# Patient Record
Sex: FEMALE | Race: ASIAN | Employment: OTHER | URBAN - METROPOLITAN AREA
[De-identification: names, ages, dates, MRNs, and addresses within clinical notes are randomized per-mention and may not be internally consistent; named-entity substitution may affect disease eponyms.]

---

## 2023-01-29 ENCOUNTER — HOSPITAL ENCOUNTER (OUTPATIENT)
Age: 73
Discharge: HOME | End: 2023-01-29
Payer: MEDICARE

## 2023-01-29 VITALS
WEIGHT: 122 LBS | SYSTOLIC BLOOD PRESSURE: 147 MMHG | HEIGHT: 64 IN | RESPIRATION RATE: 20 BRPM | TEMPERATURE: 99 F | HEART RATE: 71 BPM | BODY MASS INDEX: 20.83 KG/M2 | DIASTOLIC BLOOD PRESSURE: 67 MMHG | OXYGEN SATURATION: 99 %

## 2023-01-29 DIAGNOSIS — L02.214 ABSCESS OF GROIN, RIGHT: Primary | ICD-10-CM

## 2023-01-29 PROCEDURE — 99204 OFFICE O/P NEW MOD 45 MIN: CPT

## 2023-01-29 PROCEDURE — 10061 I&D ABSCESS COMP/MULTIPLE: CPT

## 2023-01-29 PROCEDURE — 87070 CULTURE OTHR SPECIMN AEROBIC: CPT | Performed by: PHYSICIAN ASSISTANT

## 2023-01-29 PROCEDURE — 87205 SMEAR GRAM STAIN: CPT | Performed by: PHYSICIAN ASSISTANT

## 2023-01-29 PROCEDURE — 87077 CULTURE AEROBIC IDENTIFY: CPT | Performed by: PHYSICIAN ASSISTANT

## 2023-01-29 RX ORDER — AMLODIPINE BESYLATE 5 MG/1
5 TABLET ORAL 2 TIMES DAILY
COMMUNITY
Start: 2023-01-06

## 2023-01-29 RX ORDER — LISINOPRIL 20 MG/1
20 TABLET ORAL 2 TIMES DAILY
COMMUNITY
Start: 2023-01-06

## 2023-01-29 RX ORDER — SULFAMETHOXAZOLE AND TRIMETHOPRIM 800; 160 MG/1; MG/1
1 TABLET ORAL 2 TIMES DAILY
COMMUNITY
Start: 2023-01-28

## 2023-01-29 RX ORDER — ATENOLOL 25 MG/1
25 TABLET ORAL 2 TIMES DAILY
COMMUNITY
Start: 2022-12-29

## 2023-01-29 RX ORDER — ATORVASTATIN CALCIUM 40 MG/1
40 TABLET, FILM COATED ORAL EVERY EVENING
COMMUNITY
Start: 2023-01-05

## 2023-01-29 RX ORDER — PANTOPRAZOLE SODIUM 40 MG/1
40 TABLET, DELAYED RELEASE ORAL DAILY
COMMUNITY
Start: 2022-11-16

## 2023-01-29 RX ORDER — DAPAGLIFLOZIN 10 MG/1
10 TABLET, FILM COATED ORAL DAILY
COMMUNITY
Start: 2022-12-16

## 2023-01-29 NOTE — DISCHARGE INSTRUCTIONS
Keep the area clean and dry close follow-up in 48 to 72 hours for wound recheck complete Bactrim as prescribed

## 2023-01-29 NOTE — ED INITIAL ASSESSMENT (HPI)
Right  Groin -  Abscess noted Friday. Ruptured yesterday  bloody pus-like discharge noted. Pt has been cleaning and applying antibiotic ointment. Yesterday pt called her niece who is a doctor in [place] who prescribed her Bactrim  on day 2 of 7.     denies fever.

## 2023-01-31 ENCOUNTER — HOSPITAL ENCOUNTER (OUTPATIENT)
Age: 73
Discharge: HOME | End: 2023-01-31
Payer: MEDICARE

## 2023-01-31 VITALS
OXYGEN SATURATION: 98 % | TEMPERATURE: 98 F | HEART RATE: 66 BPM | RESPIRATION RATE: 18 BRPM | DIASTOLIC BLOOD PRESSURE: 65 MMHG | WEIGHT: 122 LBS | SYSTOLIC BLOOD PRESSURE: 173 MMHG | BODY MASS INDEX: 21 KG/M2

## 2023-01-31 DIAGNOSIS — Z51.89 WOUND CHECK, ABSCESS: Primary | ICD-10-CM

## 2023-01-31 PROCEDURE — 99211 OFF/OP EST MAY X REQ PHY/QHP: CPT

## 2023-01-31 NOTE — DISCHARGE INSTRUCTIONS
Continue to take Bactrim as previously prescribed. It is okay to shower and wash wound gently with mild soap and water. Cover with a bandage. Signs of infection (wound)    Observe wound closely for signs of infection:  Increased redness, red streaking, increased pain, increased warmth to area, swelling, pus-like drainage, fever, muscle aches, or generally feeling weak or ill.     Seek immediate medical attention if any of the above symptoms occur

## 2023-02-04 RX ORDER — CEPHALEXIN 500 MG/1
500 CAPSULE ORAL 3 TIMES DAILY
Qty: 21 CAPSULE | Refills: 0 | Status: SHIPPED | OUTPATIENT
Start: 2023-02-04

## 2024-04-06 ENCOUNTER — APPOINTMENT (OUTPATIENT)
Dept: GENERAL RADIOLOGY | Facility: HOSPITAL | Age: 74
End: 2024-04-06
Attending: EMERGENCY MEDICINE
Payer: MEDICARE

## 2024-04-06 ENCOUNTER — HOSPITAL ENCOUNTER (INPATIENT)
Facility: HOSPITAL | Age: 74
LOS: 6 days | Discharge: HOME HEALTH | End: 2024-04-12
Attending: EMERGENCY MEDICINE | Admitting: HOSPITALIST
Payer: MEDICARE

## 2024-04-06 ENCOUNTER — APPOINTMENT (OUTPATIENT)
Dept: CT IMAGING | Facility: HOSPITAL | Age: 74
End: 2024-04-06
Attending: EMERGENCY MEDICINE
Payer: MEDICARE

## 2024-04-06 ENCOUNTER — HOSPITAL ENCOUNTER (INPATIENT)
Facility: HOSPITAL | Age: 74
LOS: 6 days | Discharge: HOME | End: 2024-04-12
Attending: EMERGENCY MEDICINE | Admitting: HOSPITALIST
Payer: MEDICARE

## 2024-04-06 DIAGNOSIS — D72.829 LEUKOCYTOSIS, UNSPECIFIED TYPE: ICD-10-CM

## 2024-04-06 DIAGNOSIS — R55 SYNCOPE AND COLLAPSE: Primary | ICD-10-CM

## 2024-04-06 DIAGNOSIS — S20.211A CONTUSION OF RIB ON RIGHT SIDE, INITIAL ENCOUNTER: ICD-10-CM

## 2024-04-06 DIAGNOSIS — E83.42 HYPOMAGNESEMIA: ICD-10-CM

## 2024-04-06 LAB
ALBUMIN SERPL-MCNC: 2.4 G/DL (ref 3.4–5)
ALBUMIN/GLOB SERPL: 0.5 {RATIO} (ref 1–2)
ALP LIVER SERPL-CCNC: 178 U/L
ALT SERPL-CCNC: 109 U/L
ANION GAP SERPL CALC-SCNC: 9 MMOL/L (ref 0–18)
AST SERPL-CCNC: 74 U/L (ref 15–37)
ATRIAL RATE: 84 BPM
BASOPHILS # BLD AUTO: 0.03 X10(3) UL (ref 0–0.2)
BASOPHILS NFR BLD AUTO: 0.2 %
BILIRUB SERPL-MCNC: 0.7 MG/DL (ref 0.1–2)
BILIRUB UR QL STRIP.AUTO: NEGATIVE
BUN BLD-MCNC: 26 MG/DL (ref 9–23)
CALCIUM BLD-MCNC: 9.1 MG/DL (ref 8.5–10.1)
CHLORIDE SERPL-SCNC: 102 MMOL/L (ref 98–112)
CLARITY UR REFRACT.AUTO: CLEAR
CO2 SERPL-SCNC: 23 MMOL/L (ref 21–32)
CREAT BLD-MCNC: 1.16 MG/DL
EGFRCR SERPLBLD CKD-EPI 2021: 50 ML/MIN/1.73M2 (ref 60–?)
EOSINOPHIL # BLD AUTO: 0.01 X10(3) UL (ref 0–0.7)
EOSINOPHIL NFR BLD AUTO: 0.1 %
ERYTHROCYTE [DISTWIDTH] IN BLOOD BY AUTOMATED COUNT: 14.8 %
EST. AVERAGE GLUCOSE BLD GHB EST-MCNC: 209 MG/DL (ref 68–126)
GLOBULIN PLAS-MCNC: 4.8 G/DL (ref 2.8–4.4)
GLUCOSE BLD-MCNC: 170 MG/DL (ref 70–99)
GLUCOSE BLD-MCNC: 173 MG/DL (ref 70–99)
GLUCOSE BLD-MCNC: 174 MG/DL (ref 70–99)
GLUCOSE BLD-MCNC: 179 MG/DL (ref 70–99)
GLUCOSE UR STRIP.AUTO-MCNC: >1000 MG/DL
HBA1C MFR BLD: 8.9 % (ref ?–5.7)
HCT VFR BLD AUTO: 36.1 %
HGB BLD-MCNC: 11.8 G/DL
HYALINE CASTS #/AREA URNS AUTO: PRESENT /LPF
IMM GRANULOCYTES # BLD AUTO: 0.15 X10(3) UL (ref 0–1)
IMM GRANULOCYTES NFR BLD: 0.8 %
LEUKOCYTE ESTERASE UR QL STRIP.AUTO: NEGATIVE
LYMPHOCYTES # BLD AUTO: 0.94 X10(3) UL (ref 1–4)
LYMPHOCYTES NFR BLD AUTO: 4.8 %
MAGNESIUM SERPL-MCNC: 1.5 MG/DL (ref 1.6–2.6)
MCH RBC QN AUTO: 26.3 PG (ref 26–34)
MCHC RBC AUTO-ENTMCNC: 32.7 G/DL (ref 31–37)
MCV RBC AUTO: 80.6 FL
MONOCYTES # BLD AUTO: 1.27 X10(3) UL (ref 0.1–1)
MONOCYTES NFR BLD AUTO: 6.4 %
NEUTROPHILS # BLD AUTO: 17.34 X10 (3) UL (ref 1.5–7.7)
NEUTROPHILS # BLD AUTO: 17.34 X10(3) UL (ref 1.5–7.7)
NEUTROPHILS NFR BLD AUTO: 87.7 %
NITRITE UR QL STRIP.AUTO: NEGATIVE
OSMOLALITY SERPL CALC.SUM OF ELEC: 287 MOSM/KG (ref 275–295)
P AXIS: 67 DEGREES
P-R INTERVAL: 154 MS
PH UR STRIP.AUTO: 5.5 [PH] (ref 5–8)
PLATELET # BLD AUTO: 459 10(3)UL (ref 150–450)
POTASSIUM SERPL-SCNC: 3.4 MMOL/L (ref 3.5–5.1)
PROT SERPL-MCNC: 7.2 G/DL (ref 6.4–8.2)
PROT UR STRIP.AUTO-MCNC: 50 MG/DL
Q-T INTERVAL: 344 MS
QRS DURATION: 76 MS
QTC CALCULATION (BEZET): 406 MS
R AXIS: 33 DEGREES
RBC # BLD AUTO: 4.48 X10(6)UL
SODIUM SERPL-SCNC: 134 MMOL/L (ref 136–145)
SP GR UR STRIP.AUTO: 1.03 (ref 1–1.03)
T AXIS: 52 DEGREES
TROPONIN I SERPL HS-MCNC: 4 NG/L
TSI SER-ACNC: 0.82 MIU/ML (ref 0.36–3.74)
UROBILINOGEN UR STRIP.AUTO-MCNC: NORMAL MG/DL
VENTRICULAR RATE: 84 BPM
WBC # BLD AUTO: 19.7 X10(3) UL (ref 4–11)

## 2024-04-06 PROCEDURE — 71101 X-RAY EXAM UNILAT RIBS/CHEST: CPT | Performed by: EMERGENCY MEDICINE

## 2024-04-06 PROCEDURE — 99223 1ST HOSP IP/OBS HIGH 75: CPT | Performed by: HOSPITALIST

## 2024-04-06 PROCEDURE — 70450 CT HEAD/BRAIN W/O DYE: CPT | Performed by: EMERGENCY MEDICINE

## 2024-04-06 RX ORDER — PANTOPRAZOLE SODIUM 40 MG/1
40 TABLET, DELAYED RELEASE ORAL
COMMUNITY

## 2024-04-06 RX ORDER — ATORVASTATIN CALCIUM 40 MG/1
40 TABLET, FILM COATED ORAL EVERY EVENING
Status: DISCONTINUED | OUTPATIENT
Start: 2024-04-06 | End: 2024-04-12

## 2024-04-06 RX ORDER — ROSUVASTATIN CALCIUM 20 MG/1
20 TABLET, COATED ORAL NIGHTLY
COMMUNITY

## 2024-04-06 RX ORDER — ACETAMINOPHEN 325 MG/1
650 TABLET ORAL EVERY 6 HOURS PRN
Status: DISCONTINUED | OUTPATIENT
Start: 2024-04-06 | End: 2024-04-12

## 2024-04-06 RX ORDER — AMLODIPINE BESYLATE 5 MG/1
5 TABLET ORAL 2 TIMES DAILY
Status: DISCONTINUED | OUTPATIENT
Start: 2024-04-06 | End: 2024-04-07

## 2024-04-06 RX ORDER — AMLODIPINE AND VALSARTAN 10; 320 MG/1; MG/1
1 TABLET ORAL DAILY
COMMUNITY

## 2024-04-06 RX ORDER — DEXTROSE MONOHYDRATE 25 G/50ML
50 INJECTION, SOLUTION INTRAVENOUS
Status: DISCONTINUED | OUTPATIENT
Start: 2024-04-06 | End: 2024-04-12

## 2024-04-06 RX ORDER — PANTOPRAZOLE SODIUM 40 MG/1
40 TABLET, DELAYED RELEASE ORAL DAILY
Status: DISCONTINUED | OUTPATIENT
Start: 2024-04-06 | End: 2024-04-12

## 2024-04-06 RX ORDER — MAGNESIUM SULFATE HEPTAHYDRATE 40 MG/ML
2 INJECTION, SOLUTION INTRAVENOUS ONCE
Status: COMPLETED | OUTPATIENT
Start: 2024-04-06 | End: 2024-04-06

## 2024-04-06 RX ORDER — ATENOLOL 25 MG/1
25 TABLET ORAL 2 TIMES DAILY
Status: DISCONTINUED | OUTPATIENT
Start: 2024-04-06 | End: 2024-04-12

## 2024-04-06 RX ORDER — POTASSIUM CHLORIDE 20 MEQ/1
40 TABLET, EXTENDED RELEASE ORAL EVERY 4 HOURS
Status: COMPLETED | OUTPATIENT
Start: 2024-04-06 | End: 2024-04-06

## 2024-04-06 RX ORDER — NICOTINE POLACRILEX 4 MG
15 LOZENGE BUCCAL
Status: DISCONTINUED | OUTPATIENT
Start: 2024-04-06 | End: 2024-04-12

## 2024-04-06 RX ORDER — NICOTINE POLACRILEX 4 MG
30 LOZENGE BUCCAL
Status: DISCONTINUED | OUTPATIENT
Start: 2024-04-06 | End: 2024-04-12

## 2024-04-06 NOTE — ED QUICK NOTES
Orders for admission, patient is aware of plan and ready to go upstairs. Any questions, please call ED RN Ellen at extension [phone].     Patient Covid vaccination status: Fully vaccinated     COVID Test Ordered in ED: None    COVID Suspicion at Admission: N/A    Running Infusions:      Mental Status/LOC at time of transport: AOX4    Other pertinent information: Pt from home being admitted for multiple near syncope events.   CIWA score: N/A   NIH score:  0

## 2024-04-06 NOTE — ED PROVIDER NOTES
Patient Seen in: [facility] Emergency Department      History     Chief Complaint   Patient presents with    Fall    Dizziness     Stated Complaint: falls    Subjective:   73-year-old female, history of diabetes hypertension hyperlipidemia, presents via EMS with her  with complaints of near syncope, near fall and some dizziness.  Patient 3 days ago she stood up and felt lightheaded, states anything happened today.   states did not fall the ground but patient states that her son found her lying on the floor.  She denies any pain other than some right lateral rib pain which has been having for the last couple weeks after she fell into her dresser.  Has not seen any medical care for this as of yet.  She is wearing a rib belt for that.  States she has no pain otherwise.  No headache.  No blurred vision but she states when she gets lightheaded that she sees some white across her visual fields.  No neck pain.  No chest pain cough or shortness of breath.  No abdominal pain or back pain nausea vomiting diarrhea numbness or acute focal weakness.  NIH is 0            Objective:   Past Medical History:   Diagnosis Date    Diabetes (HCC)     Essential hypertension     High blood pressure     High cholesterol     Hyperlipidemia               Past Surgical History:   Procedure Laterality Date    HYSTERECTOMY      TOTAL ABDOM HYSTERECTOMY                  Social History     Socioeconomic History    Marital status:    Tobacco Use    Smoking status: Never    Smokeless tobacco: Never   Vaping Use    Vaping Use: Never used   Substance and Sexual Activity    Alcohol use: Never     Social Determinants of Health     Food Insecurity: No Food Insecurity (4/6/2024)    Food Insecurity     Food Insecurity: Never true   Transportation Needs: No Transportation Needs (4/6/2024)    Transportation Needs     Lack of Transportation: No   Housing Stability: Low Risk  (4/6/2024)    Housing Stability     Housing Instability:  No              Review of Systems   Constitutional:  Negative for fever.   HENT: Negative.     Respiratory:  Negative for cough and shortness of breath.    Cardiovascular:  Negative for palpitations and leg swelling.   Gastrointestinal:  Negative for abdominal pain.   Genitourinary:  Negative for dysuria.   Neurological:  Positive for dizziness and syncope. Negative for seizures, facial asymmetry, speech difficulty, numbness and headaches.   Hematological:  Does not bruise/bleed easily.   All other systems reviewed and are negative.      Positive for stated complaint: falls  Other systems are as noted in HPI.  Constitutional and vital signs reviewed.      All other systems reviewed and negative except as noted above.    Physical Exam     ED Triage Vitals [04/06/24 0831]   BP (!) 179/65   Pulse 90   Resp 17   Temp    Temp src    SpO2 95 %   O2 Device        Current:/56   Pulse 63   Resp 25   Wt 57.6 kg   SpO2 98%   BMI 21.80 kg/m²         Physical Exam  Vitals and nursing note reviewed.   Constitutional:       General: She is not in acute distress.     Appearance: She is well-developed. She is not ill-appearing, toxic-appearing or diaphoretic.   HENT:      Head: Normocephalic and atraumatic.   Eyes:      General: No visual field deficit.     Extraocular Movements: Extraocular movements intact.      Right eye: Normal extraocular motion and no nystagmus.      Left eye: Normal extraocular motion and no nystagmus.      Pupils: Pupils are equal, round, and reactive to light.   Cardiovascular:      Rate and Rhythm: Normal rate and regular rhythm.      Heart sounds: Normal heart sounds.   Pulmonary:      Effort: Pulmonary effort is normal. No respiratory distress.      Breath sounds: Normal breath sounds.   Abdominal:      General: There is no distension.      Palpations: Abdomen is soft.      Tenderness: There is no abdominal tenderness.   Musculoskeletal:         General: Normal range of motion.      Cervical  back: Normal range of motion and neck supple. No rigidity.   Skin:     General: Skin is warm and dry.   Neurological:      Mental Status: She is alert.      GCS: GCS eye subscore is 4. GCS verbal subscore is 5. GCS motor subscore is 6.      Cranial Nerves: No cranial nerve deficit, dysarthria or facial asymmetry.      Sensory: No sensory deficit.      Motor: No weakness.      Coordination: Coordination normal.   Psychiatric:         Mood and Affect: Mood normal.         Behavior: Behavior normal.         NIHSS 0      ED Course     Labs Reviewed   COMP METABOLIC PANEL (14) - Abnormal; Notable for the following components:       Result Value    Glucose 179 (*)     Sodium 134 (*)     Potassium 3.4 (*)     BUN 26 (*)     Creatinine 1.16 (*)     eGFR-Cr 50 (*)     AST 74 (*)      (*)     Alkaline Phosphatase 178 (*)     Albumin 2.4 (*)     Globulin  4.8 (*)     A/G Ratio 0.5 (*)     All other components within normal limits   URINALYSIS, ROUTINE - Abnormal; Notable for the following components:    Glucose Urine >1000 (*)     Ketones Urine Trace (*)     Blood Urine Trace (*)     Protein Urine 50 (*)     Squamous Epi. Cells Few (*)     Hyaline Casts Present (*)     All other components within normal limits   MAGNESIUM - Abnormal; Notable for the following components:    Magnesium 1.5 (*)     All other components within normal limits   POCT GLUCOSE - Abnormal; Notable for the following components:    POC Glucose 173 (*)     All other components within normal limits   CBC W/ DIFFERENTIAL - Abnormal; Notable for the following components:    WBC 19.7 (*)     HGB 11.8 (*)     .0 (*)     Neutrophil Absolute Prelim 17.34 (*)     Neutrophil Absolute 17.34 (*)     Lymphocyte Absolute 0.94 (*)     Monocyte Absolute 1.27 (*)     All other components within normal limits   TROPONIN I HIGH SENSITIVITY - Normal   TSH W REFLEX TO FREE T4 - Normal   CBC WITH DIFFERENTIAL WITH PLATELET    Narrative:     The following orders  were created for panel order CBC With Differential With Platelet.  Procedure                               Abnormality         Status                     ---------                               -----------         ------                     CBC W/ DIFFERENTIAL[[id number]]          Abnormal            Final result                 Please view results for these tests on the individual orders.   HEMOGLOBIN A1C   RAINBOW DRAW BLUE   RAINBOW DRAW GOLD     EKG    Rate, intervals and axes as noted on EKG Report.  Rate: 84  Rhythm: Sinus Rhythm  Reading: EKG sinus rhythm 84 bpm.  Normal axis.  No ST elevations.  Intervals are stable.  There are no previous EKGs to compare to    Patient placed on cardiac monitor for telemetry monitoring secondary to near syncope/syncope/dizziniess. Interpretation at bedside by me is sinus rhythm.                ED Course as of 04/06/24 1400  ------------------------------------------------------------  Time: 04/06 0847  Comment: Orthostatics neg              MDM      CT BRAIN OR HEAD ([id number])    Result Date: 4/6/2024  CONCLUSION:  No acute intracranial abnormality identified.  Minimal age-indeterminate microvascular ischemic changes in the cerebral white matter. If there is clinical concern for acute ischemia/infarction, an MRI of the brain would be recommended for further evaluation.  LOCATION:  [facility]   Dictated by ([facility]): Ken Rivera MD on 4/06/2024 at 9:53 AM     Finalized by ([facility]): Ken Rivera MD on 4/06/2024 at 9:53 AM       XR RIBS WITH CHEST (3 VIEWS), RIGHT  (CPT=71101)    Result Date: 4/6/2024  CONCLUSION:  Negative chest and right rib views.   LOCATION:  [facility]     Dictated by ([facility]): Rosalee Ace MD on 4/06/2024 at 9:07 AM     Finalized by ([facility]): Rosalee Ace MD on 4/06/2024 at 9:21 AM        I independent interpreted the CT of the brain without any obvious signs of acute hemorrhage    Differential diagnosis includes, but not limited to, arrhythmia, dehydration,  orthostasis, infection    Family at bedside helpful to provide information on the history presenting illness    External chart review demonstrates some outpatient visits with cardiology at [facility] in March of this year    73-year-old female with multiple episodes of near syncope versus syncope.  She has some right lateral rib pain from 1 these episodes several weeks ago.  Sinus rhythm on monitor here and EKG.  Workup is benign.  Orthostats are negative here.  Discussed [facility], will see in consultation.  [facility], awaiting bed assignment      Admission disposition: 4/6/2024  2:00 PM                                        Medical Decision Making      Disposition and Plan     Clinical Impression:  1. Syncope and collapse    2. Contusion of rib on right side, initial encounter    3. Leukocytosis, unspecified type    4. Hypomagnesemia         Disposition:  Admit  4/6/2024  2:00 pm    Follow-up:  No follow-up provider specified.        Medications Prescribed:  Current Discharge Medication List                            Hospital Problems       Present on Admission  Date Reviewed: 1/29/2023            ICD-10-CM Noted POA    * (Principal) Syncope and collapse R55 4/6/2024 Unknown    Contusion of rib on right side, initial encounter S20.211A 4/6/2024 Unknown    Hypomagnesemia E83.42 4/6/2024 Unknown    Leukocytosis, unspecified type D72.829 4/6/2024 Unknown

## 2024-04-06 NOTE — CONSULTS
Cardiology Consultation      Essence Acuña Patient Status:  Inpatient    1950 MRN [id number]   Location [facility] 3NE-A Attending Viktoriya Tsang MD   Hosp Day # 0 PCP No primary care provider on file.     Reason for Consultation:  Near syncope    History of Present Illness:  Essence Acuña is a(n) 73 year old female with chronic medical conditions including htn, hld, dm who presents with episodes of almost passing out.  Patient notes 2 episodes where this happened.  Both of which times it appears that her vision gets blurry and then she finds her self slightly confused regarding her situation.  Noted that today she found her family member trying to get her attention when she had the episode.  She does endorse some dizziness.  Denies chest pain, palpitations, nausea, emesis, diaphoresis.  Cardiology asked to evaluate.    History:  Past Medical History:   Diagnosis Date    Diabetes (HCC)     Essential hypertension     High blood pressure     High cholesterol     Hyperlipidemia      Past Surgical History:   Procedure Laterality Date    HYSTERECTOMY      TOTAL ABDOM HYSTERECTOMY       History reviewed. No pertinent family history.   reports that she has never smoked. She has never used smokeless tobacco. She reports that she does not drink alcohol.    Allergies:  No Known Allergies    Medications:  No current facility-administered medications for this encounter.    Review of Systems:  A comprehensive review of systems was negative if not otherwise mention in above HPI.    /56   Pulse 63   Resp 25   Wt 127 lb (57.6 kg)   SpO2 98%   BMI 21.80 kg/m²   No data recorded.       Intake/Output Summary (Last 24 hours) at 2024 1355  Last data filed at 2024 1003  Gross per 24 hour   Intake 1000 ml   Output --   Net 1000 ml     Wt Readings from Last 3 Encounters:   24 127 lb (57.6 kg)   23 122 lb (55.3 kg)   23 122 lb (55.3 kg)       Physical Exam:   General: Alert and oriented x 3.  No apparent distress. No respiratory or constitutional distress.  HEENT: Normocephalic, anicteric sclera, neck supple.  Neck: No JVD  Cardiac: Regular rate and rhythm. S1, S2 normal.  Grade 1 systolic murmur.  Lungs: Clear without wheezes, rales, rhonchi or dullness.  Normal excursions and effort.  Abdomen: Soft, non-tender. BS-present.  Extremities: Without clubbing, cyanosis or edema.    Neurologic: Alert and oriented, normal affect.  Skin: Warm and dry.     Laboratory Data:  Lab Results   Component Value Date    WBC 19.7 04/06/2024    HGB 11.8 04/06/2024    HCT 36.1 04/06/2024    .0 04/06/2024    CREATSERUM 1.16 04/06/2024    BUN 26 04/06/2024     04/06/2024    K 3.4 04/06/2024     04/06/2024    CO2 23.0 04/06/2024     04/06/2024    CA 9.1 04/06/2024    ALB 2.4 04/06/2024    ALKPHO 178 04/06/2024    BILT 0.7 04/06/2024    TP 7.2 04/06/2024    AST 74 04/06/2024     04/06/2024    TSH 0.817 04/06/2024    MG 1.5 04/06/2024    PGLU 173 04/06/2024       Imaging/results:  TSH WNL  EKG - NSR. Possible septal infarct pattern   CXR -  No actue cardiopulm disease  CT brain - no acute abnormality  High sensitivity troponin WNL       Assessment:  Pre-Syncope  Mild hyponatremia   Transaminitis   Hypoalbuminemia   Leukocytosis   HTN  HLD  DM      Plan:  Check orthostats  Tele monitoring  Agree with IVF  Likely MCT at discharge      3/16/2024  -- [facility]   1.  Left ventricle: The cavity size is normal. Systolic function is normal. The estimated ejection fraction is 55-60%. Wall motion is normal; there are no regional wall motion abnormalities. Grade I diastolic dysfunction. E/ E prime ratio is 5 and 6.    2.  Left atrium: The atrium is mildly to moderately dilated.    3.  Right atrium: The atrium is normal in size.    4.  Right ventricle: The cavity size is normal. Systolic function is normal.    5.  Aortic root: The aortic root is normal-sized.    6.  Aortic valve: Mild  thickening, consistent with sclerosis. There is no stenosis. There is no regurgitation.    7.  Mitral valve: There is trivial regurgitation.    8.  Tricuspid valve: There is trivial regurgitation.    9.  Inferior vena cava: The IVC is normal-sized.    10.  Pericardium, extracardiac: There is no pericardial effusion.          Thank you for allowing me to participate in the care of your patient.      Darek Florian DO  Cardiologist  [facility]  4/6/2024 1:55 PM      Note to the patient: The 21st Century Cures Act makes medical notes like these available to patients in the interest of transparency. However, be advised that this is a medical document. It is intended as peer to peer communication. It is written in medical language and may contain abbreviations or verbiage that are unfamiliar. It may appear blunt or direct. Medical documents are intended to carry relevant information, facts as evident, and clinical opinion of the practitioner.     Disclaimer: Components of this note were documented using voice recognition system and are subject to errors not corrected at proofreading. Contact the author of this note for any clarifications.

## 2024-04-06 NOTE — ED INITIAL ASSESSMENT (HPI)
Pt to ED via EMS from home for dizziness and near syncopal episode.     Denies hitting her head. . Pt is Aox4, awake and alert. Pt has complaints of right rib pain from a fall earlier in the week.

## 2024-04-06 NOTE — H&P
[facility]     Essence Acuña Patient Status:  Inpatient    1950 MRN [id number]   Location [facility] 3NE-A Attending Viktoriya Tsang MD   Hosp Day # 0 PCP No primary care provider on file.     Chief Complaint: Dizziness and near syncope    Subjective:    History of Present Illness:     Essence Acuña is a 73 year old female with history of diabetes type 2 hypertension hyperlipidemia presents emergency room today with an episode of near syncope.  Patient states that she stood up and felt lightheaded.   states she did not fall to the ground but patient thinks her son found her lying on the floor.  She is complaining of discomfort in the right lateral rib 5 6 out of 10 worse with palpation.  She denies any headache dizziness blurred vision chest pain cough shortness of breath.  NIH is 0.  Patient describes a similar episode in the past with blurry vision slight confusion and not remembering much about the details of the episode.  She denies history of seizures.    History/Other:    Past Medical History:  Past Medical History:   Diagnosis Date    Diabetes (HCC)     Essential hypertension     High blood pressure     High cholesterol     Hyperlipidemia      Past Surgical History:   Past Surgical History:   Procedure Laterality Date    HYSTERECTOMY      TOTAL ABDOM HYSTERECTOMY        Family History:   History reviewed. No pertinent family history.  Social History:    reports that she has never smoked. She has never used smokeless tobacco. She reports that she does not drink alcohol.     Allergies: No Known Allergies    Medications:    No current facility-administered medications on file prior to encounter.     Current Outpatient Medications on File Prior to Encounter   Medication Sig Dispense Refill    amLODIPine 5 MG Oral Tab Take 1 tablet (5 mg total) by mouth 2 (two) times daily.      atorvastatin 40 MG Oral Tab Take 1 tablet (40 mg total) by mouth every evening.       Discussed results with patient. Long history of tobacco use and persistent hematuria. No signs of infection. Urology referral placed FARXIGA 10 MG Oral Tab Take 1 tablet (10 mg total) by mouth daily.      metFORMIN HCl 1000 MG Oral Tab Take 1 tablet (1,000 mg total) by mouth 2 (two) times daily.      cephalexin 500 MG Oral Cap Take 1 capsule (500 mg total) by mouth 3 (three) times daily. 21 capsule 0    atenolol 25 MG Oral Tab Take 25 mg by mouth 2 (two) times daily.      lisinopril 20 MG Oral Tab Take 20 mg by mouth 2 (two) times daily.      pantoprazole 40 MG Oral Tab EC Take 40 mg by mouth daily.      sulfamethoxazole-trimethoprim -160 MG Oral Tab per tablet Take 1 tablet by mouth 2 (two) times daily.         Review of Systems:   A comprehensive review of systems was completed.    Pertinent positives and negatives noted in the HPI.    Objective:   Physical Exam:    /56   Pulse 63   Resp 25   Wt 127 lb (57.6 kg)   SpO2 98%   BMI 21.80 kg/m²   General: No acute distress, Alert  Respiratory: No rhonchi, no wheezes  Cardiovascular: S1, S2. Regular rate and rhythm  Abdomen: Soft, Non-tender, non-distended, positive bowel sounds  Neuro: No new focal deficits  Extremities: No edema      Results:    Labs:      Labs Last 24 Hours:    Recent Labs   Lab 04/06/24  0841   RBC 4.48   HGB 11.8*   HCT 36.1   MCV 80.6   MCH 26.3   MCHC 32.7   RDW 14.8   NEPRELIM 17.34*   WBC 19.7*   .0*       Recent Labs   Lab 04/06/24  0841   *   BUN 26*   CREATSERUM 1.16*   EGFRCR 50*   CA 9.1   ALB 2.4*   *   K 3.4*      CO2 23.0   ALKPHO 178*   AST 74*   *   BILT 0.7   TP 7.2       Lab Results   Component Value Date    PT 12.2 (L) 08/23/2011    INR 0.89 (L) 08/23/2011       Recent Labs   Lab 04/06/24  0841   TROPHS 4       No results for input(s): \"TROP\", \"PBNP\" in the last 168 hours.    No results for input(s): \"PCT\" in the last 168 hours.    Imaging: Imaging data reviewed in Epic.    Assessment & Plan:      # 73 years old female with history hypertension diabetes presents with near syncopal episode  -Rule out  hypoglycemia  -Rule out orthostatic hypotension  -Cardiac telemetry monitoring  -Neurochecks every 4 hours    # EEG    # Moderately elevated liver enzymes will monitor closely no evidence of obstruction no abdominal pain    # Leukocytosis with WBC of 19 mild left shift and mild thrombocytosis of 459  -UA normal  -Chest x-ray no evidence of pneumonia        Plan of care discussed with patient and emergency room physician    Viktoriya Tsang MD    Supplementary Documentation:     The 21st Century Cures Act makes medical notes like these available to patients in the interest of transparency. Please be advised this is a medical document. Medical documents are intended to carry relevant information, facts as evident, and the clinical opinion of the practitioner. The medical note is intended as peer to peer communication and may appear blunt or direct. It is written in medical language and may contain abbreviations or verbiage that are unfamiliar.               **Certification      PHYSICIAN Certification of Need for Inpatient Hospitalization - Initial Certification    Patient will require inpatient services that will reasonably be expected to span two midnight's based on the clinical documentation in H+P.   Based on patients current state of illness, I anticipate that, after discharge, patient will require TBD.

## 2024-04-07 ENCOUNTER — NURSE ONLY (OUTPATIENT)
Dept: ELECTROPHYSIOLOGY | Facility: HOSPITAL | Age: 74
End: 2024-04-07
Attending: Other
Payer: MEDICARE

## 2024-04-07 ENCOUNTER — APPOINTMENT (OUTPATIENT)
Dept: ULTRASOUND IMAGING | Facility: HOSPITAL | Age: 74
End: 2024-04-07
Attending: Other
Payer: MEDICARE

## 2024-04-07 LAB
ALBUMIN SERPL-MCNC: 2.4 G/DL (ref 3.4–5)
ALBUMIN/GLOB SERPL: 0.5 {RATIO} (ref 1–2)
ALP LIVER SERPL-CCNC: 245 U/L
ALT SERPL-CCNC: 122 U/L
ANION GAP SERPL CALC-SCNC: 5 MMOL/L (ref 0–18)
AST SERPL-CCNC: 64 U/L (ref 15–37)
BILIRUB SERPL-MCNC: 0.6 MG/DL (ref 0.1–2)
BUN BLD-MCNC: 20 MG/DL (ref 9–23)
CALCIUM BLD-MCNC: 9 MG/DL (ref 8.5–10.1)
CHLORIDE SERPL-SCNC: 108 MMOL/L (ref 98–112)
CO2 SERPL-SCNC: 22 MMOL/L (ref 21–32)
CREAT BLD-MCNC: 0.88 MG/DL
EGFRCR SERPLBLD CKD-EPI 2021: 70 ML/MIN/1.73M2 (ref 60–?)
ERYTHROCYTE [DISTWIDTH] IN BLOOD BY AUTOMATED COUNT: 15 %
GLOBULIN PLAS-MCNC: 4.8 G/DL (ref 2.8–4.4)
GLUCOSE BLD-MCNC: 148 MG/DL (ref 70–99)
GLUCOSE BLD-MCNC: 244 MG/DL (ref 70–99)
GLUCOSE BLD-MCNC: 256 MG/DL (ref 70–99)
GLUCOSE BLD-MCNC: 277 MG/DL (ref 70–99)
GLUCOSE BLD-MCNC: 362 MG/DL (ref 70–99)
HCT VFR BLD AUTO: 37.1 %
HGB BLD-MCNC: 12.1 G/DL
MCH RBC QN AUTO: 26.1 PG (ref 26–34)
MCHC RBC AUTO-ENTMCNC: 32.6 G/DL (ref 31–37)
MCV RBC AUTO: 80.1 FL
OSMOLALITY SERPL CALC.SUM OF ELEC: 291 MOSM/KG (ref 275–295)
PLATELET # BLD AUTO: 496 10(3)UL (ref 150–450)
POTASSIUM SERPL-SCNC: 4.7 MMOL/L (ref 3.5–5.1)
POTASSIUM SERPL-SCNC: 5 MMOL/L (ref 3.5–5.1)
PROT SERPL-MCNC: 7.2 G/DL (ref 6.4–8.2)
RBC # BLD AUTO: 4.63 X10(6)UL
SODIUM SERPL-SCNC: 135 MMOL/L (ref 136–145)
WBC # BLD AUTO: 16.1 X10(3) UL (ref 4–11)

## 2024-04-07 PROCEDURE — 95819 EEG AWAKE AND ASLEEP: CPT | Performed by: OTHER

## 2024-04-07 PROCEDURE — 93880 EXTRACRANIAL BILAT STUDY: CPT | Performed by: OTHER

## 2024-04-07 PROCEDURE — 99233 SBSQ HOSP IP/OBS HIGH 50: CPT | Performed by: HOSPITALIST

## 2024-04-07 PROCEDURE — 99223 1ST HOSP IP/OBS HIGH 75: CPT | Performed by: OTHER

## 2024-04-07 RX ORDER — AMLODIPINE AND VALSARTAN 10; 320 MG/1; MG/1
1 TABLET ORAL DAILY
Status: DISCONTINUED | OUTPATIENT
Start: 2024-04-07 | End: 2024-04-12

## 2024-04-07 NOTE — PROGRESS NOTES
[facility]   part of [facility]     Hospitalist Progress Note     Essence Acuña Patient Status:  Inpatient    1950 MRN [id number]   Location [facility] 3NE-A Attending Viktoriya Tsang MD   Hosp Day # 1 PCP No primary care provider on file.     Chief Complaint: nearsyncope    Subjective:     Patient denies cp sob weakness    Objective:    Review of Systems:   A comprehensive review of systems was completed; pertinent positive and negatives stated in subjective.    Vital signs:  Temp:  [98 °F (36.7 °C)-99.4 °F (37.4 °C)] 98.4 °F (36.9 °C)  Pulse:  [63-79] 79  Resp:  [16-25] 16  BP: (131-150)/(56-88) 148/68  SpO2:  [94 %-99 %] 98 %    Physical Exam:    General: No acute distress  Respiratory: No wheezes, no rhonchi  Cardiovascular: S1, S2, regular rate and rhythm  Abdomen: Soft, Non-tender, non-distended, positive bowel sounds  Neuro: No new focal deficits.   Extremities: No edema      Diagnostic Data:    Labs:  Recent Labs   Lab 24  0841   WBC 19.7*   HGB 11.8*   MCV 80.6   .0*       Recent Labs   Lab 24  0841 24  0024   *  --    BUN 26*  --    CREATSERUM 1.16*  --    CA 9.1  --    ALB 2.4*  --    *  --    K 3.4* 5.0     --    CO2 23.0  --    ALKPHO 178*  --    AST 74*  --    *  --    BILT 0.7  --    TP 7.2  --        CrCl cannot be calculated (Unknown ideal weight.).    Recent Labs   Lab 24  0841   TROPHS 4       No results for input(s): \"PTP\", \"INR\" in the last 168 hours.               Microbiology    No results found for this visit on 24.      Imaging: Reviewed in Epic.    Medications:    amLODIPine Besylate-Valsartan  1 tablet Oral Daily    atenolol  25 mg Oral BID    atorvastatin  40 mg Oral QPM    pantoprazole  40 mg Oral Daily    insulin aspart  1-5 Units Subcutaneous TID AC and HS       Assessment & Plan:      ## 73 years old female with history hypertension diabetes presents with near syncopal episode  -Rule out  hypoglycemia  -Rule out orthostatic hypotension  -Cardiac telemetry monitoring  -Neurochecks every 4 hours     # EEG     # Moderately elevated liver enzymes will monitor closely no evidence of obstruction no abdominal pain     # Leukocytosis with WBC of 19 mild left shift and mild thrombocytosis of 459  -UA normal  -Chest x-ray no evidence of pneumonia      Viktoriya Tsang MD    Supplementary Documentation:     Quality:  DVT Mechanical Prophylaxis:   SCDs, Early ambuation  DVT Pharmacologic Prophylaxis   Medication   None                Code Status: Not on file  John: No urinary catheter in place  John Duration (in days):   Central line:    HUGO:     Discharge is dependent on: progress  At this point Ms. Acuña is expected to be discharge to: tbd    The 21st Century Cures Act makes medical notes like these available to patients in the interest of transparency. Please be advised this is a medical document. Medical documents are intended to carry relevant information, facts as evident, and the clinical opinion of the practitioner. The medical note is intended as peer to peer communication and may appear blunt or direct. It is written in medical language and may contain abbreviations or verbiage that are unfamiliar.

## 2024-04-07 NOTE — PROCEDURES
[facility] - ELECTROENCEPHALOGRAM (EEG) REPORT  Patient Name:  Essence Acuña   MRN / CSN:  [id number] / [id number]   Date of Birth / Age:  7/8/1950 /  73 year old   Encounter Date:  4/7/24         METHODS:  Twenty-two electrodes were applied according to the 10-20-electrode placement system on this routine audio-video EEG. EKG monitoring, monopolar and bipolar montages are routinely utilized. The record was obtained on a digital system.      OBJECT:  This is a 73 year old year-old female with a PMH of diabetes type 2, HTN, HL, who presented for episodes of near syncope.     The EEG was requested to assess for epileptiform activity and change in mental status.     State(s) of consciousness: Awake and asleep    Relevant medications:    amLODIPine Besylate-Valsartan  1 tablet Oral Daily    atenolol  25 mg Oral BID    atorvastatin  40 mg Oral QPM    pantoprazole  40 mg Oral Daily    insulin aspart  1-5 Units Subcutaneous TID AC and HS       FINDINGS:  1) Background: A posterior-dominant background rhythm of 8-10 Hz with an amplitude of 15-30 microvolts is seen.  2) Sleep: Normal, symmetrical sleep complexes were noted.  3) Abnormalities:  None  4) Activation:                    HV: Not performed.                    IPS: performed.    IMPRESSION:  This EEG is a normal study recorded in the awake and asleep states. No focal, lateralizing, or epileptiform activity is seen and no seizures are recorded.     Report covers  Start 4/7/24  at 1749  End 4/7/24 at 1816    SIGNATURES:  Reddy Manzano D.O.  [facility]

## 2024-04-07 NOTE — CONSULTS
Neurology H&P    Essence Acuña Patient Status:  Inpatient    1950 MRN [id number]   [facility] 3NE-A Attending Viktoriya Tsang MD   Hosp Day # 1 PCP No primary care provider on file.     Subjective:  Essence Acuña is a(n) 73 year old female past medical history significant for diabetes type 2, HTN, HL, who presented for episodes of near syncope.  Neurology on consult for episodes of near syncope.  Cardiology consult as well.  Patient is CT of the head in the emergency department which was okay.  It did not show any new or acute lesions.  Mild microvascular disease was seen. She states that she has had a couple episode in the past week where she lost consciousness and really lost consciousness.  She states that her vision gets sort of blurry and that she closes her eyes afraid that she might pass out.  Per son at bedside he states that he did witness 1 exams.  She complained of some dizziness and feeling like her vision was blurry then seem to have passed out he states that his father actually held her against the wall but she was dead weight.  He states that she started to have some snoring.  They picked her up into her on the floor.  She woke up shortly afterwards and was confused about as to what happened but knew that she was on the floor new her family was able to speak follow commands.  She has no history of seizure.  She never had any ASM.  She has no numbness weakness or tingling today.    Current Medications:   amLODIPine Besylate-Valsartan  1 tablet Oral Daily    atenolol  25 mg Oral BID    atorvastatin  40 mg Oral QPM    pantoprazole  40 mg Oral Daily    insulin aspart  1-5 Units Subcutaneous TID AC and HS       No current outpatient medications on file.       Problem List:  Patient Active Problem List   Diagnosis    Syncope and collapse    Contusion of rib on right side, initial encounter    Leukocytosis, unspecified type    Hypomagnesemia       PMHx:  Past Medical History:   Diagnosis  Date    Diabetes (HCC)     Essential hypertension     High blood pressure     High cholesterol     Hyperlipidemia        PSHx:  Past Surgical History:   Procedure Laterality Date    HYSTERECTOMY      TOTAL ABDOM HYSTERECTOMY         SocHx:  Social History     Socioeconomic History    Marital status:    Tobacco Use    Smoking status: Never    Smokeless tobacco: Never   Vaping Use    Vaping Use: Never used   Substance and Sexual Activity    Alcohol use: Never     Social Determinants of Health     Food Insecurity: No Food Insecurity (4/6/2024)    Food Insecurity     Food Insecurity: Never true   Transportation Needs: No Transportation Needs (4/6/2024)    Transportation Needs     Lack of Transportation: No   Housing Stability: Low Risk  (4/6/2024)    Housing Stability     Housing Instability: No       Family History:  History reviewed. No pertinent family history.    Family history of seizures    ROS:  10 point ROS completed and was negative, except for pertinent positive and negatives stated in subjective.    Objective/Physical Exam:    Vital Signs:  Blood pressure 148/68, pulse 79, temperature 98.4 °F (36.9 °C), temperature source Oral, resp. rate 16, weight 127 lb (57.6 kg), SpO2 98%.    Gen: Awake and in no apparent distress  HEENT: moist mucus membranes  Neck: Supple  Cardiovascular: Regular rate and rhythm, no murmur  Pulm: CTAB  GI: non-tender, normal bowel sounds  Skin: normal, dry  Extremities: No clubbing or cyanosis      Neurologic:   MENTAL STATUS: alert, ox3, normal attention, language and fund of knowledge.      CRANIAL NERVES II to XII: PERRLA, no ptosis or diplopia, EOM intact, facial sensation intact, strong eye closure, face is symmetric, no dysarthria, tongue midline,  no tongue fasciculations or atrophy, strong shoulder shrug.    MOTOR EXAMINATION: normal tone, no fasciculations, normal strength throughout in UEs and LEs      SENSORY EXAMINATION:  UE: intact to light touch,    LE: intact to  light touch,      COORDINATION:  No dysmetria, or intention tremors     REFLEXES: 2+ at biceps, 2+ brachioradialis, 2+ at patella     GAIT: deferred        Labs:  Lab Results   Component Value Date    WBC 16.1 04/07/2024    HGB 12.1 04/07/2024    HCT 37.1 04/07/2024    .0 04/07/2024    CREATSERUM 0.88 04/07/2024    BUN 20 04/07/2024     04/07/2024    K 4.7 04/07/2024     04/07/2024    CO2 22.0 04/07/2024     04/07/2024    CA 9.0 04/07/2024    ALB 2.4 04/07/2024    ALKPHO 245 04/07/2024    BILT 0.6 04/07/2024    TP 7.2 04/07/2024    AST 64 04/07/2024     04/07/2024    PGLU 148 04/07/2024       Imaging:  CTH 4/6/24  ONCLUSION:  No acute intracranial abnormality identified.  Minimal age-indeterminate microvascular ischemic changes in the cerebral white matter. If there is clinical concern for acute ischemia/infarction, an MRI of the brain would be recommended for   further evaluation.     Assessment:  This is 73-year-old female with medical problems who presented for episodes of near syncope or syncope.  Neurology is consulted for episodes of near syncope.  Nonfocal exam today.  She is no history of seizures.  No history of stroke or TIA.  CTh was done and shows mild microvascular disease but nothing concerning at this time.  MRI of the brain has been ordered.  EEG ordered.  She has no personal or family history of seizures.  She is never on any ASM.  She had no shaking spells no loss of bladder control or tongue biting.  Possible this was a seizure versus a vasovagal spell.  Orthostatic blood pressures were negative.  Neurology has been consulted as well.  US of the carotids as well.      Plan:  Near syncope  - Agree with orthostatics  - cardiology on consult  - CTH was reviewed and shows no lesions that would cause near syncope  - CUS  - EEG  - May start Keppra pending EEG results  - Orthostatics were negative  - MRI brain      Fadi Manzano, DO  Neurology

## 2024-04-07 NOTE — PLAN OF CARE
Assumed pt care at 1930  Pt is A&Ox4, following commands.   Pt on room air, VSS.  NSR/SB on tele  Pt denies pain.  Pt up ad kaylyn./  at bedside  Pt on carb control diet. Tolerating diet.   R AC saline locked  Neuro q4  Qid accucheck  Bed in lowest position, call light in reach.     Problem: Diabetes/Glucose Control  Goal: Glucose maintained within prescribed range  Description: INTERVENTIONS:  - Monitor Blood Glucose as ordered  - Assess for signs and symptoms of hyperglycemia and hypoglycemia  - Administer ordered medications to maintain glucose within target range  - Assess barriers to adequate nutritional intake and initiate nutrition consult as needed  - Instruct patient on self management of diabetes  Outcome: Progressing

## 2024-04-07 NOTE — PROGRESS NOTES
Cardiology Progress Note    Essence Acuña Patient Status:  Inpatient    1950 MRN [id number]   [facility] 3NE-A Attending Viktoriya Tsang MD   Hosp Day # 1 PCP No primary care provider on file.       Subjective:     Patient seen and examined. No complaints. No further episodes. No acute overnight events.     Objective:   Temp: 98.4 °F (36.9 °C)  Pulse: 79  Resp: 16  BP: 148/68    Intake/Output:     Intake/Output Summary (Last 24 hours) at 2024 0939  Last data filed at 2024 1003  Gross per 24 hour   Intake 1000 ml   Output --   Net 1000 ml       Last 3 Weights   24 1344 127 lb (57.6 kg)   23 1327 122 lb (55.3 kg)   23 1317 122 lb (55.3 kg)       Tele: NSR     Physical Exam:     General: Alert and oriented x 3. No apparent distress. No respiratory or constitutional distress.  HEENT: Normocephalic, anicteric sclera, neck supple.  Neck: No JVD  Cardiac: Regular rate and rhythm. S1, S2 normal. No murmur, pericardial rub, S3.  Lungs: Clear without wheezes, rales, rhonchi or dullness.  Normal excursions and effort.  Abdomen: Soft, non-tender.   Extremities: Without clubbing, cyanosis or edema.    Neurologic: Alert and oriented, normal affect.  Skin: Warm and dry.     Laboratory/Data:    Labs:         Recent Labs   Lab 24  0841   WBC 19.7*   HGB 11.8*   MCV 80.6   .0*       Recent Labs   Lab 24  0841 24  0024   *  --    K 3.4* 5.0     --    CO2 23.0  --    BUN 26*  --    CREATSERUM 1.16*  --    CA 9.1  --    MG 1.5*  --    *  --        Recent Labs   Lab 24  0841   *   AST 74*   ALB 2.4*       No results for input(s): \"TROP\" in the last 168 hours.    Allergies:   No Known Allergies    Medications:  Current Facility-Administered Medications   Medication Dose Route Frequency    amLODIPine Besylate-Valsartan (EXFORGE)  MG per tab 1 tablet - patient supplied  1 tablet Oral Daily    atenolol (Tenormin) tab 25 mg  25 mg Oral  BID    atorvastatin (Lipitor) tab 40 mg  40 mg Oral QPM    pantoprazole (Protonix) DR tab 40 mg  40 mg Oral Daily    glucose (Dex4) 15 GM/59ML oral liquid 15 g  15 g Oral Q15 Min PRN    Or    glucose (Glutose) 40% oral gel 15 g  15 g Oral Q15 Min PRN    Or    glucose-vitamin C (Dex-4) chewable tab 4 tablet  4 tablet Oral Q15 Min PRN    Or    dextrose 50% injection 50 mL  50 mL Intravenous Q15 Min PRN    Or    glucose (Dex4) 15 GM/59ML oral liquid 30 g  30 g Oral Q15 Min PRN    Or    glucose (Glutose) 40% oral gel 30 g  30 g Oral Q15 Min PRN    Or    glucose-vitamin C (Dex-4) chewable tab 8 tablet  8 tablet Oral Q15 Min PRN    insulin aspart (NovoLOG) 100 Units/mL FlexPen 1-5 Units  1-5 Units Subcutaneous TID AC and HS    acetaminophen (Tylenol) tab 650 mg  650 mg Oral Q6H PRN         3/16/2024  -- [facility]   1.  Left ventricle: The cavity size is normal. Systolic function is normal. The estimated ejection fraction is 55-60%. Wall motion is normal; there are no regional wall motion abnormalities. Grade I diastolic dysfunction. E/ E prime ratio is 5 and 6.    2.  Left atrium: The atrium is mildly to moderately dilated.    3.  Right atrium: The atrium is normal in size.    4.  Right ventricle: The cavity size is normal. Systolic function is normal.    5.  Aortic root: The aortic root is normal-sized.    6.  Aortic valve: Mild thickening, consistent with sclerosis. There is no stenosis. There is no regurgitation.    7.  Mitral valve: There is trivial regurgitation.    8.  Tricuspid valve: There is trivial regurgitation.    9.  Inferior vena cava: The IVC is normal-sized.    10.  Pericardium, extracardiac: There is no pericardial effusion.        Assessment:  Pre-Syncope  Mild hyponatremia   Transaminitis   Hypoalbuminemia   Leukocytosis   HTN  HLD  DM        Plan:  Orthostats WNL   Tele monitoring - no events thus far   Likely MCT at discharge  Neuro workup pending       Darek Florian  DO  Cardiologist  [facility] [place]  4/7/2024 9:39 AM        Note to the patient: The 21st Century Cures Act makes medical notes like these available to patients in the interest of transparency. However, be advised that this is a medical document. It is intended as peer to peer communication. It is written in medical language and may contain abbreviations or verbiage that are unfamiliar. It may appear blunt or direct. Medical documents are intended to carry relevant information, facts as evident, and clinical opinion of the practitioner.     Disclaimer: Components of this note were documented using voice recognition system and are subject to errors not corrected at proofreading. Contact the author of this note for any clarifications.

## 2024-04-07 NOTE — PLAN OF CARE
Assumed care at 0730.  Patient is awake and alert and oriented x4.  Room air.  NSR on tele.  Denies pain, or dizziness  QID accuchecks.  1800 con carb diet, tolerating  SBA to BR.   Needs MRI and EEG. Neuro consulted today.  Will continue current plan of care.   Problem: Diabetes/Glucose Control  Goal: Glucose maintained within prescribed range  Description: INTERVENTIONS:  - Monitor Blood Glucose as ordered  - Assess for signs and symptoms of hyperglycemia and hypoglycemia  - Administer ordered medications to maintain glucose within target range  - Assess barriers to adequate nutritional intake and initiate nutrition consult as needed  - Instruct patient on self management of diabetes  Outcome: Progressing

## 2024-04-08 ENCOUNTER — APPOINTMENT (OUTPATIENT)
Dept: GENERAL RADIOLOGY | Facility: HOSPITAL | Age: 74
End: 2024-04-08
Attending: HOSPITALIST
Payer: MEDICARE

## 2024-04-08 ENCOUNTER — APPOINTMENT (OUTPATIENT)
Dept: MRI IMAGING | Facility: HOSPITAL | Age: 74
End: 2024-04-08
Attending: Other
Payer: MEDICARE

## 2024-04-08 LAB
ALBUMIN SERPL-MCNC: 2.4 G/DL (ref 3.4–5)
ALBUMIN/GLOB SERPL: 0.5 {RATIO} (ref 1–2)
ALP LIVER SERPL-CCNC: 275 U/L
ALT SERPL-CCNC: 127 U/L
ANION GAP SERPL CALC-SCNC: 7 MMOL/L (ref 0–18)
AST SERPL-CCNC: 60 U/L (ref 15–37)
BILIRUB SERPL-MCNC: 0.9 MG/DL (ref 0.1–2)
BUN BLD-MCNC: 24 MG/DL (ref 9–23)
CALCIUM BLD-MCNC: 9.1 MG/DL (ref 8.5–10.1)
CHLORIDE SERPL-SCNC: 105 MMOL/L (ref 98–112)
CO2 SERPL-SCNC: 23 MMOL/L (ref 21–32)
CREAT BLD-MCNC: 1.07 MG/DL
EGFRCR SERPLBLD CKD-EPI 2021: 55 ML/MIN/1.73M2 (ref 60–?)
ERYTHROCYTE [DISTWIDTH] IN BLOOD BY AUTOMATED COUNT: 14.8 %
FLUAV + FLUBV RNA SPEC NAA+PROBE: NEGATIVE
FLUAV + FLUBV RNA SPEC NAA+PROBE: NEGATIVE
GLOBULIN PLAS-MCNC: 4.7 G/DL (ref 2.8–4.4)
GLUCOSE BLD-MCNC: 192 MG/DL (ref 70–99)
GLUCOSE BLD-MCNC: 225 MG/DL (ref 70–99)
GLUCOSE BLD-MCNC: 268 MG/DL (ref 70–99)
GLUCOSE BLD-MCNC: 294 MG/DL (ref 70–99)
GLUCOSE BLD-MCNC: 313 MG/DL (ref 70–99)
HCT VFR BLD AUTO: 35.5 %
HGB BLD-MCNC: 11.7 G/DL
MCH RBC QN AUTO: 26.2 PG (ref 26–34)
MCHC RBC AUTO-ENTMCNC: 33 G/DL (ref 31–37)
MCV RBC AUTO: 79.6 FL
OSMOLALITY SERPL CALC.SUM OF ELEC: 295 MOSM/KG (ref 275–295)
PLATELET # BLD AUTO: 550 10(3)UL (ref 150–450)
POTASSIUM SERPL-SCNC: 4.2 MMOL/L (ref 3.5–5.1)
PROT SERPL-MCNC: 7.1 G/DL (ref 6.4–8.2)
RBC # BLD AUTO: 4.46 X10(6)UL
RSV RNA SPEC NAA+PROBE: NEGATIVE
SARS-COV-2 RNA RESP QL NAA+PROBE: NOT DETECTED
SODIUM SERPL-SCNC: 135 MMOL/L (ref 136–145)
WBC # BLD AUTO: 17.6 X10(3) UL (ref 4–11)

## 2024-04-08 PROCEDURE — 99232 SBSQ HOSP IP/OBS MODERATE 35: CPT

## 2024-04-08 PROCEDURE — 99233 SBSQ HOSP IP/OBS HIGH 50: CPT | Performed by: HOSPITALIST

## 2024-04-08 PROCEDURE — 70551 MRI BRAIN STEM W/O DYE: CPT | Performed by: OTHER

## 2024-04-08 PROCEDURE — 71045 X-RAY EXAM CHEST 1 VIEW: CPT | Performed by: HOSPITALIST

## 2024-04-08 NOTE — PROGRESS NOTES
04/08/24 1135 04/08/24 1138 04/08/24 1140   Vitals   /59 133/62 141/55   MAP (mmHg) 84 84 78   BP Location Left arm Left arm Left arm   BP Method Automatic Automatic Automatic   Patient Position Lying Sitting Standing

## 2024-04-08 NOTE — PROGRESS NOTES
[facility]  [facility] Neurology Progress Note    Essence Acuña Patient Status:  Inpatient    1950 MRN [id number]   Location [facility] 3NE-A Attending Viktoriya Tsang MD   Hosp Day # 2 PCP No primary care provider on file.     CC: Near syncope event    Subjective:  Seen for a follow up visit today. Doing well, no further syncopal/near syncope events overnight. Denies any acute vision changes or headache, no speech disturbances, no focal muscle weakness or paresthesias. Wants to go home. Admits to being an anxious person. Will follow up with a counselor and her PCP.       MEDICATIONS:  No current outpatient medications on file.     Current Facility-Administered Medications   Medication Dose Route Frequency    amLODIPine Besylate-Valsartan (EXFORGE)  MG per tab 1 tablet - patient supplied  1 tablet Oral Daily    atenolol (Tenormin) tab 25 mg  25 mg Oral BID    atorvastatin (Lipitor) tab 40 mg  40 mg Oral QPM    pantoprazole (Protonix) DR tab 40 mg  40 mg Oral Daily    glucose (Dex4) 15 GM/59ML oral liquid 15 g  15 g Oral Q15 Min PRN    Or    glucose (Glutose) 40% oral gel 15 g  15 g Oral Q15 Min PRN    Or    glucose-vitamin C (Dex-4) chewable tab 4 tablet  4 tablet Oral Q15 Min PRN    Or    dextrose 50% injection 50 mL  50 mL Intravenous Q15 Min PRN    Or    glucose (Dex4) 15 GM/59ML oral liquid 30 g  30 g Oral Q15 Min PRN    Or    glucose (Glutose) 40% oral gel 30 g  30 g Oral Q15 Min PRN    Or    glucose-vitamin C (Dex-4) chewable tab 8 tablet  8 tablet Oral Q15 Min PRN    insulin aspart (NovoLOG) 100 Units/mL FlexPen 1-5 Units  1-5 Units Subcutaneous TID AC and HS    acetaminophen (Tylenol) tab 650 mg  650 mg Oral Q6H PRN       REVIEW OF SYSTEMS:  A 10-point system was reviewed.  Pertinent positives and negatives are noted in HPI.      PHYSICAL EXAMINATION:  VITAL SIGNS: /69 (BP Location: Left arm)   Pulse 58   Temp 97.5 °F (36.4 °C) (Oral)   Resp 18   Wt 127 lb (57.6 kg)   SpO2 98%    BMI 21.80 kg/m²   GENERAL:  Patient is a 73 year old female in no acute distress.  HEENT:  Normocephalic, atraumatic  ABD: Soft, non tender  SKIN: Warm, dry, no rashes    NEUROLOGICAL:   Mental status: Oriented to person, place, and time   Speech: Fluent, no dysarthria  Memory and comprehension: Intact   Cranial Nerves: VFF, PERRL 3mm brisk, EOMI, no nystagmus, facial sensation intact, face symmetric, tongue midline, shoulder shrug equal, remainder CN intact  Motor: No drift, no focal arm or leg weakness. Motor strength is 5 out of 5 in all extremities bilaterally.   Sensory: Intact to light touch  Coordination: FTN intact  Gait: Deferred      Imaging/Diagnostics:  US CAROTID DOPPLER BILAT - DIAG IMG (CPT=93880)    Result Date: 4/7/2024  CONCLUSION:  No evidence of hemodynamically significant stenosis.   LOCATION:  [facility]     Dictated by (CST): Anival Espino MD on 4/07/2024 at 2:51 PM     Finalized by (CST): Anival Espino MD on 4/07/2024 at 2:52 PM       CT BRAIN OR HEAD (28953)    Result Date: 4/6/2024  CONCLUSION:  No acute intracranial abnormality identified.  Minimal age-indeterminate microvascular ischemic changes in the cerebral white matter. If there is clinical concern for acute ischemia/infarction, an MRI of the brain would be recommended for further evaluation.  LOCATION:  [facility]   Dictated by (CST): Ken Rivera MD on 4/06/2024 at 9:53 AM     Finalized by (CST): Ken Rivera MD on 4/06/2024 at 9:53 AM       XR RIBS WITH CHEST (3 VIEWS), RIGHT  (CPT=71101)    Result Date: 4/6/2024  CONCLUSION:  Negative chest and right rib views.   LOCATION:  [facility]     Dictated by (CST): Rosalee Ace MD on 4/06/2024 at 9:07 AM     Finalized by (CST): Rosalee Ace MD on 4/06/2024 at 9:21 AM          Labs:  Recent Labs   Lab 04/06/24  0841 04/07/24  1001   RBC 4.48 4.63   HGB 11.8* 12.1   HCT 36.1 37.1   MCV 80.6 80.1   MCH 26.3 26.1   MCHC 32.7 32.6   RDW 14.8 15.0   NEPRELIM 17.34*  --    WBC 19.7* 16.1*   PLT  459.0* 496.0*         Recent Labs   Lab 04/06/24  0841 04/07/24  0024 04/07/24  1001   *  --  256*   BUN 26*  --  20   CREATSERUM 1.16*  --  0.88   EGFRCR 50*  --  70   CA 9.1  --  9.0   *  --  135*   K 3.4* 5.0 4.7     --  108   CO2 23.0  --  22.0       Pre-morbid mRS 0      Assessment/Plan:    A 73 year old female with:    Near syncope episode - passed out and started snoring, woke up and was relatively oriented, no confusion or shaking. In a setting of NO history of seizures, CVA or TIA. Orthostatics were negative.   CT head was negative for acute pathology that would cause syncopal episode  CUS - no significant stenosis or occlusion  MRI brain - unremarkable  EEG routine - no seizure activity seen.  No need for starting antiepileptic drugs at this time as this  likely was  a vasovagal episode.   Cardiology following, will need an MCT as outpatient  Orthostatics are negative this admission  Anxiety - will follow up with PCP, will speak with a counselor as recommended.   Discussed with patient and sister. All questions answered.   No further inpatient neurological work up indicated at this time. Discussed with Dr. Little.   May follow up with Neurology as outpatient as needed.    Is this a shared or split note between Advanced Practice Provider and Physician? No       Rosa LOPEZ  [facility]  4/8/2024, 9:17 AM   [place] # [id number]

## 2024-04-08 NOTE — PROGRESS NOTES
[facility]   [facility]     Hospitalist Progress Note     Essence Acuña Patient Status:  Inpatient    1950 MRN [id number]   Location [facility] 3NE-A Attending Viktoriya Tsang MD   Hosp Day # 2 PCP No primary care provider on file.     Chief Complaint: nearsyncope    Subjective:     Patient denies cp sob weakness    Objective:    Review of Systems:   A comprehensive review of systems was completed; pertinent positive and negatives stated in subjective.    Vital signs:  Temp:  [97.5 °F (36.4 °C)-99.6 °F (37.6 °C)] 97.5 °F (36.4 °C)  Pulse:  [58-82] 58  Resp:  [16-18] 18  BP: (137-162)/(50-69) 137/69  SpO2:  [95 %-99 %] 98 %    Physical Exam:    General: No acute distress  Respiratory: No wheezes, no rhonchi  Cardiovascular: S1, S2, regular rate and rhythm  Abdomen: Soft, Non-tender, non-distended, positive bowel sounds  Neuro: No new focal deficits.   Extremities: No edema      Diagnostic Data:    Labs:  Recent Labs   Lab 24  0841 24  1001   WBC 19.7* 16.1*   HGB 11.8* 12.1   MCV 80.6 80.1   .0* 496.0*       Recent Labs   Lab 24  0841 24  0024 24  1001   *  --  256*   BUN 26*  --  20   CREATSERUM 1.16*  --  0.88   CA 9.1  --  9.0   ALB 2.4*  --  2.4*   *  --  135*   K 3.4* 5.0 4.7     --  108   CO2 23.0  --  22.0   ALKPHO 178*  --  245*   AST 74*  --  64*   *  --  122*   BILT 0.7  --  0.6   TP 7.2  --  7.2       CrCl cannot be calculated (Unknown ideal weight.).    Recent Labs   Lab 24  0841   TROPHS 4       No results for input(s): \"PTP\", \"INR\" in the last 168 hours.               Microbiology    No results found for this visit on 24.      Imaging: Reviewed in Epic.    Medications:    amLODIPine Besylate-Valsartan  1 tablet Oral Daily    atenolol  25 mg Oral BID    atorvastatin  40 mg Oral QPM    pantoprazole  40 mg Oral Daily    insulin aspart  1-5 Units Subcutaneous TID AC and HS       Assessment & Plan:      #73  years old female with history hypertension diabetes presents with near syncopal episode  -Rule out hypoglycemia  -Rule out orthostatic hypotension  -Cardiac telemetry monitoring negative  -Neurochecks every 4 hours negative  -MRI brain and eeg negative     # EEG neg     # Moderately elevated liver enzymes will monitor closely no evidence of obstruction no abdominal pain  -outpt liver us     # Leukocytosis with WBC of 19 mild left shift and mild thrombocytosis of 459  -UA normal  -Chest x-ray no evidence of pneumonia      Viktoriya Tsang MD    Supplementary Documentation:     Quality:  DVT Mechanical Prophylaxis:   SCDs, Early ambuation  DVT Pharmacologic Prophylaxis   Medication   None                Code Status: Not on file  John: No urinary catheter in place  John Duration (in days):   Central line:    HUGO: 4/8/2024    Discharge is dependent on: progress  At this point Ms. Acuña is expected to be discharge to: tbd    The 21st Century Cures Act makes medical notes like these available to patients in the interest of transparency. Please be advised this is a medical document. Medical documents are intended to carry relevant information, facts as evident, and the clinical opinion of the practitioner. The medical note is intended as peer to peer communication and may appear blunt or direct. It is written in medical language and may contain abbreviations or verbiage that are unfamiliar.

## 2024-04-08 NOTE — DISCHARGE INSTRUCTIONS
Medicare Referrals for Psychiatry and Counseling   [facility]  [address]  [address]  [place] [address]  [phone]    [facility]  Multiple: [address], [address], [place], [place] -or- [address], [address], [place], [place] -or - [address], [address], [place], [place] -or- [address]  [phone]    [facility]  [address] , [address], [place], [place]  [phone]    [facility]  [address], [address], [place], [place]  [phone]    [facility]  [address], [address], [place], [place]  [phone]    [facility] Locations: [facility] Locations: [facility], [facility]   [phone]    [facility]   [address], [address], [place], [place]  [phone]  Discharge Instructions: Caring for Your Raudel-Mccartney Drainage Tube   Your healthcare provider discharged you with a Raudel-Mccartney drainage tube. They commonly leave this drain within the abdomen and other cavities after surgery. It helps drain and collect blood and body fluid after surgery. This can prevent swelling and reduces the risk for infection. The tube is held in place by a few stitches. It's covered with a bandage. Your healthcare provider will remove the drain when they determine you no longer need it.   Home care  Don’t sleep on the same side as the tube.  Secure the tube and bag inside your clothing with a safety pin. This helps keep the tube from being pulled out.  Empty your drain at least twice a day. Empty it more often if the drain is full. Wash and dry your hands before emptying the drain.  Lift the opening on the drain.  Drain the fluid into a measuring cup.  Record the amount of fluid each time you empty the drain. Include the date and time it was emptied. Share this information with  your healthcare provider on your next visit.  Squeeze the bulb with your hands until you hear air coming out of the bulb if your healthcare provider has instructed you to do so (sometimes the bulb is used as a reservoir without suction). Check with your healthcare provider about specific drain instructions.  Close the opening.    If you are to change the dressing around the tube, follow the directions your provider has given you. Some dressings may not need to be changed, but your provider will let you know. Here are some general steps to follow:  Wash your hands.  Remove the old bandage.  Wash your hands again.  Clean the skin around the incision and tube site as instructed.  Put a new bandage on the incision and tube site. Make the bandage large enough to cover the whole incision area.  Tape the bandage in place.    Talk with your healthcare provider about showering with the drain. You may need to keep the bandage and tube site dry when you shower. Ask your healthcare team about the best way to do this.  “Stripping” the tube helps keep blood clots from blocking the tube. Ask your healthcare team how often you should strip the tube. Stripping may not be needed, depending on where and why your healthcare provider placed the tube. It may even be dangerous in some cases.  Hold the tubing where it leaves the skin, with one hand. This keeps it from pulling on the skin.  Pinch the tubing with the thumb and first finger of your other hand.  Slowly and firmly pull your thumb and first finger down the tubing. You may find it helpful to hold an alcohol swab between your fingers and the tube to lubricate the tubing.  If the pulling hurts or feels like the tube is coming out of the skin, stop. Begin again more gently.    Follow-up care  Make a follow-up appointment as directed by our staff.   When to call your healthcare provider  Call your healthcare provider right away if you have any of the following:   New or increased  pain around the tube  Redness, swelling, or warmth around the incision or tube  Drainage that is foul-smelling  Vomiting  Fever of 100.4°F ( 38°C) or higher, or as directed by your provider  Chills  Fluid leaking around the tube  Incision doesn't seem to be healing  Stitches become loose or the drain starts to come out  Tube falls out or breaks  Drainage that changes from light pink to dark red  Blood clots in the drainage bulb  A sudden increase or decrease in the amount of drainage (over 30 mL)  Camille last reviewed this educational content on 3/1/2022  © 9593-6841 [employer]. All rights reserved. This information is not intended as a substitute for professional medical care. Always follow your healthcare professional's instructions.    Once your drain output is less than 10 mL per day for 3 consecutive day please call [phone] to schedule a drain check.    Sometimes managing your health at home requires assistance.  The [facility] team has recognized your preference to use [facility].  They can be reached by phone at [phone].  The fax number for your reference is [phone].  A representative from the home health agency will contact you or your family to schedule your first visit.      [facility] for IV antibiotics- [phone]

## 2024-04-08 NOTE — PLAN OF CARE
Assumed pt care at 0730. A&Ox4. VSS. Room air. NSR/SB on tele. MRI brain completed this AM. Neuro checks q4h. R AC PIV SL. Carb controlled diet, QID accuchecks. Denies pain, denies N/V. Voiding, up SBA. Ortho BP q shift. Pt updated with POC.     Problem: Diabetes/Glucose Control  Goal: Glucose maintained within prescribed range  Description: INTERVENTIONS:  - Monitor Blood Glucose as ordered  - Assess for signs and symptoms of hyperglycemia and hypoglycemia  - Administer ordered medications to maintain glucose within target range  - Assess barriers to adequate nutritional intake and initiate nutrition consult as needed  - Instruct patient on self management of diabetes  Outcome: Progressing     Problem: Patient/Family Goals  Goal: Patient/Family Long Term Goal  Description: Patient's Long Term Goal: discharge   Interventions:  - complete MRI   - neuro c/s  - ortho BP q shift  - See additional Care Plan goals for specific interventions  Outcome: Progressing  Goal: Patient/Family Short Term Goal  Description: Patient's Short Term Goal: to prevent further syncopal episodes   Interventions:   - neuro & cards consults  - See additional Care Plan goals for specific interventions  Outcome: Progressing

## 2024-04-08 NOTE — PROGRESS NOTES
Psych Liaison on consult for concerns of panic attacks; met with patient and family at bedside. Pt and family wants to have some time to think about an appointment with therapy vs psychiatry; will [place] back by the end of the day, pt in network with services thru [facility]. Additionally placed referrals in discharge summary.     **Addendum 13:40 - pt declined being set up with providers at this time and wants to take resources home and speak to PCP. Resources in discharge summary.

## 2024-04-08 NOTE — PLAN OF CARE
End of shift note:  Pt is A&Ox4, c/o rib pain s/p fall at home, received tylenol and reported adequate pain control. Neuro checks Q4hrs. On tele, SR/SB. Room air-no reported sob. 1800 sol/carb controlled diet-eating and drinking w/ no issues. Accuchecks QID. Sba to bathroom. Plan for MRI in am. Pt updated on POC, all questions answered.

## 2024-04-09 ENCOUNTER — APPOINTMENT (OUTPATIENT)
Dept: CT IMAGING | Facility: HOSPITAL | Age: 74
End: 2024-04-09
Attending: HOSPITALIST
Payer: MEDICARE

## 2024-04-09 ENCOUNTER — APPOINTMENT (OUTPATIENT)
Dept: CT IMAGING | Facility: HOSPITAL | Age: 74
End: 2024-04-09
Attending: INTERNAL MEDICINE
Payer: MEDICARE

## 2024-04-09 PROBLEM — K75.0 HEPATIC ABSCESS (HCC): Status: ACTIVE | Noted: 2024-04-09

## 2024-04-09 LAB
ADENOVIRUS PCR:: NOT DETECTED
B PARAPERT DNA SPEC QL NAA+PROBE: NOT DETECTED
B PERT DNA SPEC QL NAA+PROBE: NOT DETECTED
C PNEUM DNA SPEC QL NAA+PROBE: NOT DETECTED
CORONAVIRUS 229E PCR:: NOT DETECTED
CORONAVIRUS HKU1 PCR:: NOT DETECTED
CORONAVIRUS NL63 PCR:: NOT DETECTED
CORONAVIRUS OC43 PCR:: NOT DETECTED
FLUAV RNA SPEC QL NAA+PROBE: NOT DETECTED
FLUBV RNA SPEC QL NAA+PROBE: NOT DETECTED
GLUCOSE BLD-MCNC: 207 MG/DL (ref 70–99)
GLUCOSE BLD-MCNC: 236 MG/DL (ref 70–99)
GLUCOSE BLD-MCNC: 294 MG/DL (ref 70–99)
GLUCOSE BLD-MCNC: 317 MG/DL (ref 70–99)
METAPNEUMOVIRUS PCR:: NOT DETECTED
MYCOPLASMA PNEUMONIA PCR:: NOT DETECTED
PARAINFLUENZA 1 PCR:: NOT DETECTED
PARAINFLUENZA 2 PCR:: NOT DETECTED
PARAINFLUENZA 3 PCR:: NOT DETECTED
PARAINFLUENZA 4 PCR:: NOT DETECTED
RHINOVIRUS/ENTERO PCR:: NOT DETECTED
RSV RNA SPEC QL NAA+PROBE: NOT DETECTED
SARS-COV-2 RNA NPH QL NAA+NON-PROBE: NOT DETECTED

## 2024-04-09 PROCEDURE — 74177 CT ABD & PELVIS W/CONTRAST: CPT | Performed by: INTERNAL MEDICINE

## 2024-04-09 PROCEDURE — 71250 CT THORAX DX C-: CPT | Performed by: INTERNAL MEDICINE

## 2024-04-09 PROCEDURE — 99232 SBSQ HOSP IP/OBS MODERATE 35: CPT | Performed by: INTERNAL MEDICINE

## 2024-04-09 PROCEDURE — 74176 CT ABD & PELVIS W/O CONTRAST: CPT | Performed by: HOSPITALIST

## 2024-04-09 RX ORDER — INSULIN DEGLUDEC 100 U/ML
5 INJECTION, SOLUTION SUBCUTANEOUS NIGHTLY
Status: DISCONTINUED | OUTPATIENT
Start: 2024-04-09 | End: 2024-04-11

## 2024-04-09 NOTE — PROGRESS NOTES
[facility]   part of [facility]     Hospitalist Progress Note     Essence Acuña Patient Status:  Inpatient    1950 MRN [id number]   Location [facility] 3NE-A Attending Viktoriya Tsang MD   Hosp Day # 3 PCP No primary care provider on file.     Chief Complaint: syncope    Subjective:     Resting comfortably in bed. Denies any abdominal pain, n/v/d. Was in [facility] in January, had some mild infectious respiratory symptoms while there, but denies any known sick contacts, GI illnesses, nightsweats, LAD, weight loss, chest pain/pressure, SOB, cough.    Objective:    Review of Systems:   A comprehensive review of systems was completed; pertinent positive and negatives stated in subjective.    Vital signs:  Temp:  [98.3 °F (36.8 °C)-101.1 °F (38.4 °C)] 98.5 °F (36.9 °C)  Pulse:  [63-82] 75  Resp:  [18] 18  BP: (135-173)/(52-87) 138/52  SpO2:  [96 %-98 %] 98 %    Physical Exam:    General: No acute distress  Respiratory: No wheezes, no rhonchi  Cardiovascular: S1, S2, regular rate and rhythm  Abdomen: Soft, Non-tender, non-distended, positive bowel sounds  Neuro: No new focal deficits.   Extremities: No edema      Diagnostic Data:    Labs:  Recent Labs   Lab 24  0841 24  1001 24  1014   WBC 19.7* 16.1* 17.6*   HGB 11.8* 12.1 11.7*   MCV 80.6 80.1 79.6*   .0* 496.0* 550.0*       Recent Labs   Lab 24  0841 24  0024 24  1001 24  1014   *  --  256* 294*   BUN 26*  --  20 24*   CREATSERUM 1.16*  --  0.88 1.07*   CA 9.1  --  9.0 9.1   ALB 2.4*  --  2.4* 2.4*   *  --  135* 135*   K 3.4* 5.0 4.7 4.2     --  108 105   CO2 23.0  --  22.0 23.0   ALKPHO 178*  --  245* 275*   AST 74*  --  64* 60*   *  --  122* 127*   BILT 0.7  --  0.6 0.9   TP 7.2  --  7.2 7.1       CrCl cannot be calculated (Unknown ideal weight.).    Recent Labs   Lab 24  0841   TROPHS 4       No results for input(s): \"PTP\", \"INR\" in the last 168 hours.      Microbiology    No results found for this visit on 04/06/24.      Imaging: Reviewed in Epic.    Medications:    piperacillin-tazobactam  3.375 g Intravenous Q8H    insulin degludec  5 Units Subcutaneous Nightly    insulin aspart  1-68 Units Subcutaneous TID CC    insulin aspart  1-10 Units Subcutaneous TID AC and HS    amLODIPine Besylate-Valsartan  1 tablet Oral Daily    atenolol  25 mg Oral BID    atorvastatin  40 mg Oral QPM    pantoprazole  40 mg Oral Daily       Assessment & Plan:      #Hepatic abscess  #fevers  #Transaminitis  #Leukocytosis   - CTAP  reviewed > 7.7 multiloculated/septated fluid density structure in R hepatic lobe suspicious for abscess  - empiric zoyn  - IR guided drainage with cultures planned for later today/tomorrow pending schedule  - CTAP with contrast ordered  - trend LFT's  - ID consulted    #Thrombocytosis, suspect reactive d/t above  - follow CBC    #Pulmonary nodules, incidental finding on CTAP  - CT chest ordered    #Syncope, suspect d/t above infection  - MRI brain and EEG without concerning findings or pathology  - IVF  - neurology signed off    #HTN  - PTA amlodipine-valsartan, atenolol    #HLD  - statin    #GERD  - PPI    #DM2 with hyperglycemia  - hyperglycemia protocol  - start low dose degludec tonight 5u at bedtime  - ICF 1:40, carb ratio 1:10      Shreya Mcmullen DO      Supplementary Documentation:     Quality:  DVT Mechanical Prophylaxis:   SCDs, Early ambuation  DVT Pharmacologic Prophylaxis   Medication   None                Code Status: Not on file  John: No urinary catheter in place  John Duration (in days):   Central line:    HUGO: 4/9/2024    Discharge is dependent on: progress  At this point Ms. Acuña is expected to be discharge to: tbd    The 21st Century Cures Act makes medical notes like these available to patients in the interest of transparency. Please be advised this is a medical document. Medical documents are intended to carry relevant information,  facts as evident, and the clinical opinion of the practitioner. The medical note is intended as peer to peer communication and may appear blunt or direct. It is written in medical language and may contain abbreviations or verbiage that are unfamiliar.

## 2024-04-09 NOTE — PLAN OF CARE
Assumed care at 0730.  A&O 4.  Room air.  Fluids- Scheduled Zosyn Q 8 started this shift. See MAR and managed orders for more details.  Carb control diet. NPO at midnight for procedure tomorrow AM.   Accuchecks. See MAR for insulin administration details.  Tele- NSR/SB.  No VTE.  PRN tylenol utilized for fever. See MAR for more details.   ID consulted this shift. See managed orders and notes for more details.   CT abd/pelvis with and without contrast and CT chest ordered and completed this shift. See managed orders and results for more details.   CT drain abscess liver ordered this shift and to be completed tomorrow. See managed orders for more details.  Ortho BP Q shift. See managed orders and notes for more details.  Neuro assessments Q 4. See managed orders and flowsheets for more details.  Independent after set up to the bathroom.     Pt oriented to the room, safety prec initiated, bed is in the lowest position and call light within reach. Pt and family at bedside updated on the plan of care. All needs met at this time.     Problem: Diabetes/Glucose Control  Goal: Glucose maintained within prescribed range  Description: INTERVENTIONS:  - Monitor Blood Glucose as ordered  - Assess for signs and symptoms of hyperglycemia and hypoglycemia  - Administer ordered medications to maintain glucose within target range  - Assess barriers to adequate nutritional intake and initiate nutrition consult as needed  - Instruct patient on self management of diabetes  Outcome: Progressing     Problem: Patient/Family Goals  Goal: Patient/Family Long Term Goal  Description: Patient's Long Term Goal: discharge   Interventions:  - complete MRI   - neuro c/s  - ortho BP q shift  - See additional Care Plan goals for specific interventions  Outcome: Progressing  Goal: Patient/Family Short Term Goal  Description: Patient's Short Term Goal: to prevent further syncopal episodes   Interventions:   - neuro & cards consults  - See additional  Care Plan goals for specific interventions  Outcome: Progressing

## 2024-04-09 NOTE — PROGRESS NOTES
04/09/24 1730 04/09/24 1735 04/09/24 1740   Vitals   /48 129/58 101/61   MAP (mmHg) 72 79 66   BP Location Left arm Left arm Left arm   BP Method Automatic Automatic Automatic   Patient Position Lying Sitting Standing     Ortho BP per Q shift order. MD notified.

## 2024-04-09 NOTE — PROGRESS NOTES
04/08/24 2000 04/08/24 2002 04/08/24 2004   Vital Signs   Resp 18 18 18   /64 (!) 164/79 (!) 173/87   MAP (mmHg) 90 100 (!) 110   BP Location Left arm Left arm Left arm   BP Method Automatic Automatic Automatic   Patient Position Lying Sitting Standing

## 2024-04-09 NOTE — CONSULTS
[facility]   part of [facility] ID CONSULT NOTE    Essence Acuña Patient Status:  Inpatient    1950 MRN [id number]   Location [facility] 3NE-A Attending Shreya Mcmullen, DO   Hosp Day # 3 PCP No primary care provider on file.       Reason for Consultation:  Possible liver abscess    History of Present Illness:  Essence Acuña is a 73 year old female with a history of HTN, HLD and DM. Patient presented  with near syncopal episode and dizziness.     Patient started having fevers . Blood culture was drawn and CT a/p was done showing possible liver abscess. Patient reports RUQ pain x 1 week. However, she felt this was secondary to a fall. Per patient's family at the bedside, she hit the area against the bedside table about 1 week ago and the pain started after that. Denies any fevers or chills prior to admission. Denies any nausea, vomiting or diarrhea. Reports stool with pasty/loose at baseline and unchanged from baseline. Denies any decrease in appetite or po intake. Denies any abx prior to admission. Denies any dysuria. Denies any SOB or cough. Per patient's family, traveled to the [facility] in January.     History:  Past Medical History:   Diagnosis Date    Diabetes (HCC)     Essential hypertension     High blood pressure     High cholesterol     Hyperlipidemia      Past Surgical History:   Procedure Laterality Date    HYSTERECTOMY      TOTAL ABDOM HYSTERECTOMY       History reviewed. No pertinent family history.   reports that she has never smoked. She has never used smokeless tobacco. She reports that she does not drink alcohol.    Allergies:  No Known Allergies    Medications:    Current Facility-Administered Medications:     piperacillin-tazobactam (Zosyn) 3.375 g in dextrose 5% 100 mL IVPB-ADDV, 3.375 g, Intravenous, Q8H    amLODIPine Besylate-Valsartan (EXFORGE)  MG per tab 1 tablet - patient supplied, 1 tablet, Oral, Daily    atenolol (Tenormin) tab 25 mg, 25  mg, Oral, BID    atorvastatin (Lipitor) tab 40 mg, 40 mg, Oral, QPM    pantoprazole (Protonix) DR tab 40 mg, 40 mg, Oral, Daily    glucose (Dex4) 15 GM/59ML oral liquid 15 g, 15 g, Oral, Q15 Min PRN **OR** glucose (Glutose) 40% oral gel 15 g, 15 g, Oral, Q15 Min PRN **OR** glucose-vitamin C (Dex-4) chewable tab 4 tablet, 4 tablet, Oral, Q15 Min PRN **OR** dextrose 50% injection 50 mL, 50 mL, Intravenous, Q15 Min PRN **OR** glucose (Dex4) 15 GM/59ML oral liquid 30 g, 30 g, Oral, Q15 Min PRN **OR** glucose (Glutose) 40% oral gel 30 g, 30 g, Oral, Q15 Min PRN **OR** glucose-vitamin C (Dex-4) chewable tab 8 tablet, 8 tablet, Oral, Q15 Min PRN    insulin aspart (NovoLOG) 100 Units/mL FlexPen 1-5 Units, 1-5 Units, Subcutaneous, TID AC and HS    acetaminophen (Tylenol) tab 650 mg, 650 mg, Oral, Q6H PRN    Review of Systems:  CONSTITUTIONAL:  No weakness or fatigue.  HEENT:  Eyes:  No visual loss Ears, Nose, Throat:  No hearing loss  SKIN:  No rash or itching.  CARDIOVASCULAR:  No chest pain  RESPIRATORY:  No shortness of breath, cough or sputum.  GASTROINTESTINAL:  No nausea, vomiting or diarrhea. + abdominal pain   GENITOURINARY:  No Burning on urination.   NEUROLOGICAL: + dizziness  MUSCULOSKELETAL:  No joint pain  HEMATOLOGIC:  No anemia  ALLERGIES:  No history of asthma    Physical Exam:  Vital signs: Blood pressure 138/58, pulse 75, temperature 98.6 °F (37 °C), temperature source Oral, resp. rate 18, weight 127 lb (57.6 kg), SpO2 98%.    General: Alert, oriented, NAD, on room air.   HEENT: Moist mucous membranes.   Neck: No lymphadenopathy.  Supple.  Cardiovascular: RRR  Respiratory: Clear to auscultation bilaterally.  No wheezes. No rhonchi.  Abdomen: Soft,RUQ tenderness, nondistended.   Musculoskeletal: No edema noted  Integument: No lesions. No erythema.    Laboratory Data:  Recent Labs   Lab 04/06/24  0841 04/07/24  1001 04/08/24  1014   RBC 4.48   < > 4.46   HGB 11.8*   < > 11.7*   HCT 36.1   < > 35.5   MCV 80.6    < > 79.6*   MCH 26.3   < > 26.2   MCHC 32.7   < > 33.0   RDW 14.8   < > 14.8   NEPRELIM 17.34*  --   --    WBC 19.7*   < > 17.6*   .0*   < > 550.0*    < > = values in this interval not displayed.     Recent Labs   Lab 04/06/24  0841 04/07/24  0024 04/07/24  1001 04/08/24  1014   *  --  256* 294*   BUN 26*  --  20 24*   CREATSERUM 1.16*  --  0.88 1.07*   CA 9.1  --  9.0 9.1   ALB 2.4*  --  2.4* 2.4*   *  --  135* 135*   K 3.4* 5.0 4.7 4.2     --  108 105   CO2 23.0  --  22.0 23.0   ALKPHO 178*  --  245* 275*   AST 74*  --  64* 60*   *  --  122* 127*   BILT 0.7  --  0.6 0.9   TP 7.2  --  7.2 7.1       Microbiology: Reviewed in EMR    Radiology: Reviewed.    ADDENDUM:      The findings were communicated with Dr. Alarcon at 10:37 a.m..  There is   appropriate read back.  Further evaluation with contrast enhanced CT   versus ultrasound was recommended.    Dictated by (CST): Anival Espino MD on 4/09/2024 at 10:41 AM       Finalized by (CST): Anival Espino MD on 4/09/2024 at 10:42 AM                   Signed: 04/09/24 1042 by Kenzie Florian MD  Narrative:    PROCEDURE:  CT ABDOMEN+PELVIS (CPT=74176)     COMPARISON:  None.     INDICATIONS:  elevated lft fever     TECHNIQUE:  Unenhanced multislice CT scanning was performed from the dome of the diaphragm to the pubic symphysis.  Dose reduction techniques were used. Dose information is transmitted to the ACR (American College of Radiology) NRDR (National Radiology  Data Registry) which includes the Dose Index Registry.     PATIENT STATED HISTORY: (As transcribed by Technologist)   Elevated lft fever.         FINDINGS:  Evaluation is limited without IV contrast.  LIVER:  There is a multi-septated/loculated fluid density structure in the right hepatic lobe which measures grossly 7.7 x 6.7 x 7.6 centimeters.  BILIARY:  No visible dilatation or calcification.    PANCREAS:  No lesion, fluid collection, ductal dilatation, or atrophy.    SPLEEN:   No enlargement or focal lesion.    KIDNEYS:  No mass, obstruction, or calcification.    ADRENALS:  No mass or enlargement.    AORTA/VASCULAR:    Unremarkable as seen on non-contrast imaging.  RETROPERITONEUM:  No mass or adenopathy.    BOWEL/MESENTERY:  No dilated bowel or wall thickening.  Normal appendix.  ABDOMINAL WALL:  No mass or hernia.    URINARY BLADDER:  No visible focal wall thickening, lesion, or calculus.    PELVIC NODES:  No adenopathy.    PELVIC ORGANS:  Hysterectomy  BONES:  No bony lesion or fracture.    LUNG BASES:  There is a 5 mm right middle lobe pulmonary nodule (series 3, image 8).  There are additional smaller pulmonary nodules present, for example a 3 mm nodule in the left lower lobe (series 3, image 5).  OTHER:  Negative.       Impression:    CONCLUSION:    1. 7.7 cm multiloculated/septated fluid density structure in the right hepatic lobe, given history findings are suspicious for hepatic abscess.    2. Pulmonary nodules as described, measuring up to 5 millimeters. If patient is at low risk, no further workup is needed. If patient is at high risk, follow up CT chest in one year is recommended.     Findings were communicated with the patient's nurse, Saranya, at 10:15 a.m..  There was appropriate read back.     LOCATION:  [place]     Dictated by (CST): Anival Espino MD on 4/09/2024 at 10:13 AM      Finalized by (CST): Anival Espino MD on 4/09/2024 at 10:22 AM        ASSESSMENT:  Suspected liver abscess (R hepatic lobe with multiloculated structure)  Fevers and leukocytosis, assume d/t above. No other obvious source of infection.  Elevated LFTs  Thrombocytosis  Near syncopal episode prior to admission. Brain CT and MRI w/o acute process. EEG w/o seizure activity. Orthostatics negative.   HTN, HLD  DM II, hgb A1C 8.9  Pulmonary nodules    PLAN:  - continue IV Zosyn  - follow blood culture  - follow temps and wbc  - follow LFTs  - noted plans for CT a/p with contrast to better evaluate  liver. Agree with plans for CT guided drainage by IR.   - reviewed labs, micro, imaging reports, available old records    Discussed case with RN, patient and patient's family at the bedside.    Thank you for allowing us to participate in the care of this patient. Please do not hesitate to call if you have any questions.   We will continue to follow with you and will make further recommendations based on her progress.    [facility]  [phone]  4/9/2024

## 2024-04-09 NOTE — PROGRESS NOTES
Pt A&Ox4 Room Air  Resting in bed  NSR on Tele  Pt  at the bedside  Meds Given per Mar  Accucheck QID  Neuro Check Q4  Orthostatic Q shift (-)  CT Abd to be completed  Safety precaution maintained  Plan of care On-Going    2345: Nasal swab Completed for RSV, Flu, SARS resulted in (-) results.     0615 Tmax 100.9 over Night.

## 2024-04-09 NOTE — H&P (VIEW-ONLY)
[facility]   part of [facility] ID CONSULT NOTE    Essence Acuña Patient Status:  Inpatient    1950 MRN [id number]   Location [facility] 3NE-A Attending Shreya Mcmullen, DO   Hosp Day # 3 PCP No primary care provider on file.       Reason for Consultation:  Possible liver abscess    History of Present Illness:  Essence Acuña is a 73 year old female with a history of HTN, HLD and DM. Patient presented  with near syncopal episode and dizziness.     Patient started having fevers . Blood culture was drawn and CT a/p was done showing possible liver abscess. Patient reports RUQ pain x 1 week. However, she felt this was secondary to a fall. Per patient's family at the bedside, she hit the area against the bedside table about 1 week ago and the pain started after that. Denies any fevers or chills prior to admission. Denies any nausea, vomiting or diarrhea. Reports stool with pasty/loose at baseline and unchanged from baseline. Denies any decrease in appetite or po intake. Denies any abx prior to admission. Denies any dysuria. Denies any SOB or cough. Per patient's family, traveled to the [facility] in January.     History:  Past Medical History:   Diagnosis Date    Diabetes (HCC)     Essential hypertension     High blood pressure     High cholesterol     Hyperlipidemia      Past Surgical History:   Procedure Laterality Date    HYSTERECTOMY      TOTAL ABDOM HYSTERECTOMY       History reviewed. No pertinent family history.   reports that she has never smoked. She has never used smokeless tobacco. She reports that she does not drink alcohol.    Allergies:  No Known Allergies    Medications:    Current Facility-Administered Medications:     piperacillin-tazobactam (Zosyn) 3.375 g in dextrose 5% 100 mL IVPB-ADDV, 3.375 g, Intravenous, Q8H    amLODIPine Besylate-Valsartan (EXFORGE)  MG per tab 1 tablet - patient supplied, 1 tablet, Oral, Daily    atenolol (Tenormin) tab 25 mg, 25  mg, Oral, BID    atorvastatin (Lipitor) tab 40 mg, 40 mg, Oral, QPM    pantoprazole (Protonix) DR tab 40 mg, 40 mg, Oral, Daily    glucose (Dex4) 15 GM/59ML oral liquid 15 g, 15 g, Oral, Q15 Min PRN **OR** glucose (Glutose) 40% oral gel 15 g, 15 g, Oral, Q15 Min PRN **OR** glucose-vitamin C (Dex-4) chewable tab 4 tablet, 4 tablet, Oral, Q15 Min PRN **OR** dextrose 50% injection 50 mL, 50 mL, Intravenous, Q15 Min PRN **OR** glucose (Dex4) 15 GM/59ML oral liquid 30 g, 30 g, Oral, Q15 Min PRN **OR** glucose (Glutose) 40% oral gel 30 g, 30 g, Oral, Q15 Min PRN **OR** glucose-vitamin C (Dex-4) chewable tab 8 tablet, 8 tablet, Oral, Q15 Min PRN    insulin aspart (NovoLOG) 100 Units/mL FlexPen 1-5 Units, 1-5 Units, Subcutaneous, TID AC and HS    acetaminophen (Tylenol) tab 650 mg, 650 mg, Oral, Q6H PRN    Review of Systems:  CONSTITUTIONAL:  No weakness or fatigue.  HEENT:  Eyes:  No visual loss Ears, Nose, Throat:  No hearing loss  SKIN:  No rash or itching.  CARDIOVASCULAR:  No chest pain  RESPIRATORY:  No shortness of breath, cough or sputum.  GASTROINTESTINAL:  No nausea, vomiting or diarrhea. + abdominal pain   GENITOURINARY:  No Burning on urination.   NEUROLOGICAL: + dizziness  MUSCULOSKELETAL:  No joint pain  HEMATOLOGIC:  No anemia  ALLERGIES:  No history of asthma    Physical Exam:  Vital signs: Blood pressure 138/58, pulse 75, temperature 98.6 °F (37 °C), temperature source Oral, resp. rate 18, weight 127 lb (57.6 kg), SpO2 98%.    General: Alert, oriented, NAD, on room air.   HEENT: Moist mucous membranes.   Neck: No lymphadenopathy.  Supple.  Cardiovascular: RRR  Respiratory: Clear to auscultation bilaterally.  No wheezes. No rhonchi.  Abdomen: Soft,RUQ tenderness, nondistended.   Musculoskeletal: No edema noted  Integument: No lesions. No erythema.    Laboratory Data:  Recent Labs   Lab 04/06/24  0841 04/07/24  1001 04/08/24  1014   RBC 4.48   < > 4.46   HGB 11.8*   < > 11.7*   HCT 36.1   < > 35.5   MCV 80.6    < > 79.6*   MCH 26.3   < > 26.2   MCHC 32.7   < > 33.0   RDW 14.8   < > 14.8   NEPRELIM 17.34*  --   --    WBC 19.7*   < > 17.6*   .0*   < > 550.0*    < > = values in this interval not displayed.     Recent Labs   Lab 04/06/24  0841 04/07/24  0024 04/07/24  1001 04/08/24  1014   *  --  256* 294*   BUN 26*  --  20 24*   CREATSERUM 1.16*  --  0.88 1.07*   CA 9.1  --  9.0 9.1   ALB 2.4*  --  2.4* 2.4*   *  --  135* 135*   K 3.4* 5.0 4.7 4.2     --  108 105   CO2 23.0  --  22.0 23.0   ALKPHO 178*  --  245* 275*   AST 74*  --  64* 60*   *  --  122* 127*   BILT 0.7  --  0.6 0.9   TP 7.2  --  7.2 7.1       Microbiology: Reviewed in EMR    Radiology: Reviewed.    ADDENDUM:      The findings were communicated with Dr. Alarcon at 10:37 a.m..  There is   appropriate read back.  Further evaluation with contrast enhanced CT   versus ultrasound was recommended.    Dictated by (CST): Anival Espino MD on 4/09/2024 at 10:41 AM       Finalized by (CST): Anival Espino MD on 4/09/2024 at 10:42 AM                   Signed: 04/09/24 1042 by Kenzie Florian MD  Narrative:    PROCEDURE:  CT ABDOMEN+PELVIS (CPT=74176)     COMPARISON:  None.     INDICATIONS:  elevated lft fever     TECHNIQUE:  Unenhanced multislice CT scanning was performed from the dome of the diaphragm to the pubic symphysis.  Dose reduction techniques were used. Dose information is transmitted to the ACR (American College of Radiology) NRDR (National Radiology  Data Registry) which includes the Dose Index Registry.     PATIENT STATED HISTORY: (As transcribed by Technologist)   Elevated lft fever.         FINDINGS:  Evaluation is limited without IV contrast.  LIVER:  There is a multi-septated/loculated fluid density structure in the right hepatic lobe which measures grossly 7.7 x 6.7 x 7.6 centimeters.  BILIARY:  No visible dilatation or calcification.    PANCREAS:  No lesion, fluid collection, ductal dilatation, or atrophy.    SPLEEN:   No enlargement or focal lesion.    KIDNEYS:  No mass, obstruction, or calcification.    ADRENALS:  No mass or enlargement.    AORTA/VASCULAR:    Unremarkable as seen on non-contrast imaging.  RETROPERITONEUM:  No mass or adenopathy.    BOWEL/MESENTERY:  No dilated bowel or wall thickening.  Normal appendix.  ABDOMINAL WALL:  No mass or hernia.    URINARY BLADDER:  No visible focal wall thickening, lesion, or calculus.    PELVIC NODES:  No adenopathy.    PELVIC ORGANS:  Hysterectomy  BONES:  No bony lesion or fracture.    LUNG BASES:  There is a 5 mm right middle lobe pulmonary nodule (series 3, image 8).  There are additional smaller pulmonary nodules present, for example a 3 mm nodule in the left lower lobe (series 3, image 5).  OTHER:  Negative.       Impression:    CONCLUSION:    1. 7.7 cm multiloculated/septated fluid density structure in the right hepatic lobe, given history findings are suspicious for hepatic abscess.    2. Pulmonary nodules as described, measuring up to 5 millimeters. If patient is at low risk, no further workup is needed. If patient is at high risk, follow up CT chest in one year is recommended.     Findings were communicated with the patient's nurse, Saranya, at 10:15 a.m..  There was appropriate read back.     LOCATION:  [place]     Dictated by (CST): Anival Espino MD on 4/09/2024 at 10:13 AM      Finalized by (CST): Anival Espino MD on 4/09/2024 at 10:22 AM        ASSESSMENT:  Suspected liver abscess (R hepatic lobe with multiloculated structure)  Fevers and leukocytosis, assume d/t above. No other obvious source of infection.  Elevated LFTs  Thrombocytosis  Near syncopal episode prior to admission. Brain CT and MRI w/o acute process. EEG w/o seizure activity. Orthostatics negative.   HTN, HLD  DM II, hgb A1C 8.9  Pulmonary nodules    PLAN:  - continue IV Zosyn  - follow blood culture  - follow temps and wbc  - follow LFTs  - noted plans for CT a/p with contrast to better evaluate  liver. Agree with plans for CT guided drainage by IR.   - reviewed labs, micro, imaging reports, available old records    Discussed case with RN, patient and patient's family at the bedside.    Thank you for allowing us to participate in the care of this patient. Please do not hesitate to call if you have any questions.   We will continue to follow with you and will make further recommendations based on her progress.    [facility]  [phone]  4/9/2024

## 2024-04-09 NOTE — IMAGING NOTE
PROGRESS NOTE       Date of Service: 2023   ABDIAS CAMARENA GIRL (Saylulu) MRN: [id number] PAC: [id number]         Physical Exam DOL: 9   GA: 35 wks 1 d   CGA: 36 wks 3 d   BW: 2130   Weight: 1978   Change 24h: 5   Change 7d: -49   Place of Service: NICU   Bed Type: Incubator      Intensive Cardiac and respiratory monitoring, continuous and/or frequent vital   sign monitoring      Vitals / Measurements:   T: 36.6   HR: 157   RR: 58   BP: 94/52 (68)   SpO2: 99      Head/Neck: Anterior fontanel is soft and flat. Sutures approximated.      Chest: BS CTAB, no increased work of breathing.      Heart: Regular rate. No murmur. Perfusion adequate.      Abdomen: Soft, non-distended.Normal bowel sounds.      Genitalia: Normal premature female genitalia.      Extremities: No deformities noted. Normal range of motion for all extremities.      Neurologic: Normal tone and activity.      Skin: Pink with no rashes, vesicles, or other lesions are noted.         Respiratory Support:   Type: Room Air   Start Date: 2023   Duration: 8         Diagnoses   System: FEN/GI   Diagnosis: Nutritional Support   starting 2023      History: Baby was made NPO on admission and started on vTPN @ 80 ml/kg/day.   MBM/DMB started DOL1. Received TPN/SMOF. 12/10 to full enteral feeds. 12/15   Added 2 feeds of Enfacare 22kcal/oz.      Assessment: Infant lost 5g. Infant 7% below birth weight. Infant with good UOP   and stooling. 1 emesis with pump over 60 minutes.      Plan: 42 ml q3h MBM with 2 feeds of Enfacare 22 neyda/oz or when no MBM available.   Nipple per cues   Monitor growth.    On pump over 60-90 minutes for h/o emesis.    Start multivitamin around 2 weeks of life.    Chem panel in am.   Lactation support.      System: Respiratory   Diagnosis: Respiratory Distress Syndrome (P22.0)   starting 2023      History: C/S @ 35-1/7 due to chronic HTN with superimposed Pre-E. DCC for 30   sec. Baby cried after birth but needed oxygen for  Received order for liver drainage in CT and reviewed with Dr Wilburn, Since pt was not made NPO until 1100, plan to proceed tomorrow morning with drainage. I spoke with Smitha MARTIN at bedside. She will make pt NPO after midnight, PT/INR in AM and will plan for later AM procedure. Pt able to sign consent in department.    desaturations, initially   blow-by, then nasal CPAP. Admitted to NICU on bCPAP 5, 30%. Weaned to RA 12/10.      Assessment: Comfortable on RA.      Plan: Monitor work of breathing and SaO2 on room air.      System: Cardiovascular   Diagnosis: Bradycardia -  (P29.12)   starting 2023      History:  4 events needing stim.  EKG. Reviewed with Dr. Pulido:   normal for age. Sinus. Normal axis for age. QTc ok.  Caffeine -.      Assessment: 3 days off caffeine.   12/15 SR event.      Plan: Continue to monitor for episodes off caffeine.      System: Infectious Disease   Diagnosis: Infectious Screen <= 28D (P00.2)   starting 2023      History: Delivered due to maternal indications (chronic hypertension). Serial   screening CBCs unremarkable. Given events blood culture sent on  and   Received Amp/Gent x36h. Final blood culture negative.      Assessment: Infant well appearing.      Plan: Monitor culture and for signs of infection.      System: Neurology   Diagnosis: At risk for Intraventricular Hemorrhage   starting 2023      History: HUS obtained 12/10 due to rayne events, resulted normal.      Plan: Repeat HUS if concerns.      Neuroimaging   Date: 2023 Type: Cranial Ultrasound   Grade-L: No Bleed Grade-R: No Bleed       System: Gestation   Diagnosis: Late  Infant 35 wks (P07.38)   starting 2023      Prematurity 2351-0153 gm (P07.18)   starting 2023      History: This is a 35 wks and 2130 grams premature infant.      Plan: PT/OT while in NICU.      System: Hyperbilirubinemia   Diagnosis: At risk for Hyperbilirubinemia   starting 2023      History: MBT A+. Initial T/D bili 4.9/0.2. Peak Tbili 13.5 on 12/10.   Phototherapy 12/10 -->.      Assessment:  Tbili 11.7, below treatment level. On full enteral feeds and   stooling.      Plan: Tbili in am.      System: Psychosocial Intervention   Diagnosis: Parental Support   starting 2023       History: 1st baby. Live in [place]. Dr Ojeda spoke with the father at the   bedside after admission and informed him of the reasons for admission to the   NICU. Parents are . Consents were obtained. Admit conference with Dr Welch   12/13; have not yet selected pediatrician.      Plan: Keep parents up to date.         Attestation      Authenticated by: ANGELA WELCH MD   Date/Time: 2023 10:21

## 2024-04-10 ENCOUNTER — APPOINTMENT (OUTPATIENT)
Dept: CT IMAGING | Facility: HOSPITAL | Age: 74
End: 2024-04-10
Attending: INTERNAL MEDICINE
Payer: MEDICARE

## 2024-04-10 LAB
ALBUMIN SERPL-MCNC: 2 G/DL (ref 3.4–5)
ALBUMIN/GLOB SERPL: 0.5 {RATIO} (ref 1–2)
ALP LIVER SERPL-CCNC: 256 U/L
ALT SERPL-CCNC: 120 U/L
ANION GAP SERPL CALC-SCNC: 6 MMOL/L (ref 0–18)
AST SERPL-CCNC: 77 U/L (ref 15–37)
BASOPHILS # BLD AUTO: 0.02 X10(3) UL (ref 0–0.2)
BASOPHILS NFR BLD AUTO: 0.1 %
BILIRUB SERPL-MCNC: 0.9 MG/DL (ref 0.1–2)
BUN BLD-MCNC: 20 MG/DL (ref 9–23)
CALCIUM BLD-MCNC: 8.9 MG/DL (ref 8.5–10.1)
CHLORIDE SERPL-SCNC: 106 MMOL/L (ref 98–112)
CO2 SERPL-SCNC: 23 MMOL/L (ref 21–32)
CREAT BLD-MCNC: 0.99 MG/DL
EGFRCR SERPLBLD CKD-EPI 2021: 61 ML/MIN/1.73M2 (ref 60–?)
EOSINOPHIL # BLD AUTO: 0.01 X10(3) UL (ref 0–0.7)
EOSINOPHIL NFR BLD AUTO: 0.1 %
ERYTHROCYTE [DISTWIDTH] IN BLOOD BY AUTOMATED COUNT: 14.6 %
GLOBULIN PLAS-MCNC: 4.4 G/DL (ref 2.8–4.4)
GLUCOSE BLD-MCNC: 190 MG/DL (ref 70–99)
GLUCOSE BLD-MCNC: 199 MG/DL (ref 70–99)
GLUCOSE BLD-MCNC: 223 MG/DL (ref 70–99)
GLUCOSE BLD-MCNC: 248 MG/DL (ref 70–99)
GLUCOSE BLD-MCNC: 256 MG/DL (ref 70–99)
GLUCOSE BLD-MCNC: 262 MG/DL (ref 70–99)
HCT VFR BLD AUTO: 31.1 %
HGB BLD-MCNC: 10.2 G/DL
IMM GRANULOCYTES # BLD AUTO: 0.17 X10(3) UL (ref 0–1)
IMM GRANULOCYTES NFR BLD: 0.9 %
INR BLD: 1.12 (ref 0.8–1.2)
LYMPHOCYTES # BLD AUTO: 0.48 X10(3) UL (ref 1–4)
LYMPHOCYTES NFR BLD AUTO: 2.5 %
MCH RBC QN AUTO: 26.1 PG (ref 26–34)
MCHC RBC AUTO-ENTMCNC: 32.8 G/DL (ref 31–37)
MCV RBC AUTO: 79.5 FL
MONOCYTES # BLD AUTO: 1.08 X10(3) UL (ref 0.1–1)
MONOCYTES NFR BLD AUTO: 5.7 %
NEUTROPHILS # BLD AUTO: 17.28 X10 (3) UL (ref 1.5–7.7)
NEUTROPHILS # BLD AUTO: 17.28 X10(3) UL (ref 1.5–7.7)
NEUTROPHILS NFR BLD AUTO: 90.7 %
OSMOLALITY SERPL CALC.SUM OF ELEC: 290 MOSM/KG (ref 275–295)
PLATELET # BLD AUTO: 603 10(3)UL (ref 150–450)
POTASSIUM SERPL-SCNC: 3.5 MMOL/L (ref 3.5–5.1)
POTASSIUM SERPL-SCNC: 5 MMOL/L (ref 3.5–5.1)
PROT SERPL-MCNC: 6.4 G/DL (ref 6.4–8.2)
PROTHROMBIN TIME: 14.4 SECONDS (ref 11.6–14.8)
RBC # BLD AUTO: 3.91 X10(6)UL
SODIUM SERPL-SCNC: 135 MMOL/L (ref 136–145)
WBC # BLD AUTO: 19 X10(3) UL (ref 4–11)

## 2024-04-10 PROCEDURE — 99232 SBSQ HOSP IP/OBS MODERATE 35: CPT | Performed by: INTERNAL MEDICINE

## 2024-04-10 PROCEDURE — 99152 MOD SED SAME PHYS/QHP 5/>YRS: CPT | Performed by: INTERNAL MEDICINE

## 2024-04-10 PROCEDURE — 49405 IMAGE CATH FLUID COLXN VISC: CPT | Performed by: INTERNAL MEDICINE

## 2024-04-10 PROCEDURE — 0F9130Z DRAINAGE OF RIGHT LOBE LIVER WITH DRAINAGE DEVICE, PERCUTANEOUS APPROACH: ICD-10-PCS | Performed by: STUDENT IN AN ORGANIZED HEALTH CARE EDUCATION/TRAINING PROGRAM

## 2024-04-10 RX ORDER — ONDANSETRON 2 MG/ML
4 INJECTION INTRAMUSCULAR; INTRAVENOUS EVERY 6 HOURS PRN
Status: DISCONTINUED | OUTPATIENT
Start: 2024-04-10 | End: 2024-04-12

## 2024-04-10 RX ORDER — NALOXONE HYDROCHLORIDE 0.4 MG/ML
80 INJECTION, SOLUTION INTRAMUSCULAR; INTRAVENOUS; SUBCUTANEOUS AS NEEDED
Status: DISCONTINUED | OUTPATIENT
Start: 2024-04-10 | End: 2024-04-10 | Stop reason: HOSPADM

## 2024-04-10 RX ORDER — SODIUM CHLORIDE 9 MG/ML
INJECTION, SOLUTION INTRAVENOUS CONTINUOUS
Status: DISCONTINUED | OUTPATIENT
Start: 2024-04-10 | End: 2024-04-10 | Stop reason: HOSPADM

## 2024-04-10 RX ORDER — MIDAZOLAM HYDROCHLORIDE 1 MG/ML
INJECTION INTRAMUSCULAR; INTRAVENOUS
Status: COMPLETED
Start: 2024-04-10 | End: 2024-04-10

## 2024-04-10 RX ORDER — FLUMAZENIL 0.1 MG/ML
0.2 INJECTION INTRAVENOUS AS NEEDED
Status: DISCONTINUED | OUTPATIENT
Start: 2024-04-10 | End: 2024-04-10 | Stop reason: HOSPADM

## 2024-04-10 RX ORDER — ONDANSETRON 2 MG/ML
INJECTION INTRAMUSCULAR; INTRAVENOUS
Status: COMPLETED
Start: 2024-04-10 | End: 2024-04-10

## 2024-04-10 RX ORDER — POTASSIUM CHLORIDE 20 MEQ/1
40 TABLET, EXTENDED RELEASE ORAL EVERY 4 HOURS
Status: COMPLETED | OUTPATIENT
Start: 2024-04-10 | End: 2024-04-10

## 2024-04-10 RX ORDER — MIDAZOLAM HYDROCHLORIDE 1 MG/ML
1 INJECTION INTRAMUSCULAR; INTRAVENOUS EVERY 5 MIN PRN
Status: DISCONTINUED | OUTPATIENT
Start: 2024-04-10 | End: 2024-04-10 | Stop reason: HOSPADM

## 2024-04-10 NOTE — PLAN OF CARE
Assumed care of 72 y/o female @1930  A/Ox4, Neuro Q4, NIH daily  RA, NSR-Tele  Carb cont diet, QID accucheck  NPO @0000 for placement of drain 4/10 @1130  Voids, up SBA  Zosyn Q8   PRN Tylenol given for fever per MAR  Safety precautions maintianed and all needs met      Problem: Diabetes/Glucose Control  Goal: Glucose maintained within prescribed range  Description: INTERVENTIONS:  - Monitor Blood Glucose as ordered  - Assess for signs and symptoms of hyperglycemia and hypoglycemia  - Administer ordered medications to maintain glucose within target range  - Assess barriers to adequate nutritional intake and initiate nutrition consult as needed  - Instruct patient on self management of diabetes  Outcome: Progressing

## 2024-04-10 NOTE — PROGRESS NOTES
[facility]   part of [facility] ID PROGRESS NOTE    Essence Acuña Patient Status:  Inpatient    1950 MRN [id number]   Location [facility] 3NE-A Attending Shreya Mcmullen, DO   Hosp Day # 4 PCP No primary care provider on file.     Abx: IV Zosyn D#1    Subjective: Patient seen and examined today. Feeling better today. Less pain. Temp early this am, afebrile now.    S/p IR drainage with purulent output.    Allergies:  No Known Allergies    Medications:    Current Facility-Administered Medications:     potassium chloride (K-Dur) tab 40 mEq, 40 mEq, Oral, Q4H    ondansetron (Zofran) 4 MG/2ML injection 4 mg, 4 mg, Intravenous, Q6H PRN    piperacillin-tazobactam (Zosyn) 3.375 g in dextrose 5% 100 mL IVPB-ADDV, 3.375 g, Intravenous, Q8H    insulin degludec 100 units/mL flextouch 5 Units, 5 Units, Subcutaneous, Nightly    insulin aspart (NovoLOG) 100 Units/mL FlexPen 1-68 Units, 1-68 Units, Subcutaneous, TID CC    insulin aspart (NovoLOG) 100 Units/mL FlexPen 1-10 Units, 1-10 Units, Subcutaneous, TID AC and HS    amLODIPine Besylate-Valsartan (EXFORGE)  MG per tab 1 tablet - patient supplied, 1 tablet, Oral, Daily    atenolol (Tenormin) tab 25 mg, 25 mg, Oral, BID    atorvastatin (Lipitor) tab 40 mg, 40 mg, Oral, QPM    pantoprazole (Protonix) DR tab 40 mg, 40 mg, Oral, Daily    glucose (Dex4) 15 GM/59ML oral liquid 15 g, 15 g, Oral, Q15 Min PRN **OR** glucose (Glutose) 40% oral gel 15 g, 15 g, Oral, Q15 Min PRN **OR** glucose-vitamin C (Dex-4) chewable tab 4 tablet, 4 tablet, Oral, Q15 Min PRN **OR** dextrose 50% injection 50 mL, 50 mL, Intravenous, Q15 Min PRN **OR** glucose (Dex4) 15 GM/59ML oral liquid 30 g, 30 g, Oral, Q15 Min PRN **OR** glucose (Glutose) 40% oral gel 30 g, 30 g, Oral, Q15 Min PRN **OR** glucose-vitamin C (Dex-4) chewable tab 8 tablet, 8 tablet, Oral, Q15 Min PRN    acetaminophen (Tylenol) tab 650 mg, 650 mg, Oral, Q6H PRN    Review of Systems:  Completed. See  pertinent positives and negatives above.    Physical Exam:  Vital signs: Blood pressure 137/53, pulse 65, temperature 97.9 °F (36.6 °C), temperature source Oral, resp. rate 22, weight 127 lb (57.6 kg), SpO2 97%.    General: Alert, oriented, NAD, on room air.   HEENT: Moist mucous membranes.   Neck: No lymphadenopathy.  Supple.  Cardiovascular: RRR  Respiratory: Clear to auscultation bilaterally.  No wheezes. No rhonchi.  Abdomen: Soft, + Drain to R abd with purulent appearing drainage, nondistended.   Musculoskeletal: No edema noted  Integument: No lesions. No erythema.    Laboratory Data:  Recent Labs   Lab 04/10/24  0701   RBC 3.91   HGB 10.2*   HCT 31.1*   MCV 79.5*   MCH 26.1   MCHC 32.8   RDW 14.6   NEPRELIM 17.28*   WBC 19.0*   .0*     Recent Labs   Lab 04/07/24  1001 04/08/24  1014 04/10/24  0701   * 294* 223*   BUN 20 24* 20   CREATSERUM 0.88 1.07* 0.99   CA 9.0 9.1 8.9   ALB 2.4* 2.4* 2.0*   * 135* 135*   K 4.7 4.2 3.5    105 106   CO2 22.0 23.0 23.0   ALKPHO 245* 275* 256*   AST 64* 60* 77*   * 127* 120*   BILT 0.6 0.9 0.9   TP 7.2 7.1 6.4       Microbiology: Reviewed in EMR    Radiology: Reviewed.    PROCEDURE:  CT DRAIN ABSCESS LIVER (CPT=49405)     COMPARISON:  [facility] , CT, CT ABDOMEN+PELVIS(CONTRAST ONLY)(CPT=74177), 4/09/2024, 4:31 PM.     INDICATIONS:  liver abscess     DESCRIPTION OF PROCEDURE:  The procedure of aspiration was discussed in detail with the patient.  This includes review of benefits, alternatives and risks. Discussion of risks, included, but was not limited to infection, bleeding and organ / nerve  injury. The patient voiced understanding and wished to proceed.  Witnessed verbal and written informed consent was obtained.  Time out was performed by the staff.     TECHNIQUE:     Prior to the procedure, I discussed with the patient and/or legal representative the potential benefits, risks, and side effects of this procedure, the likelihood of the patient  achieving goals; and the potential problems that might occur during  recuperation.  I discussed reasonable alternatives to the procedure, including risks, benefits, steps to prevent infection and side effects related to the alternatives, and risks related to not receiving this procedure. A witnessed verbal and signed  consent was obtained and documented in the patient's chart.     IV was checked and maintained by the nurse. Moderate conscious sedation was performed under continuous pulse oximetry and cardiac monitoring under my direct supervision.  The radiology nurse was in attendance throughout the exam. Moderate conscious  sedation of 2 mg Versed and 100 mcg of Fentanyl was given.  Patient was assessed and monitoring of oxygen saturation, heart rate, and blood pressure by the nursing staff and myself during the exam for a total intraservice time of 21 minutes of conscious  sedation time.     The patient was placed supine on the CT table.  Initial imaging was obtained to find the right hepatic lobe liver abscess. The right upper abdomen was prepped with chlorhexidine and covered with a body drape in the usual sterile fashion.  All operators  present for the case performed standard pre-procedural prep including hand washing, sterile gloves, mask, and cap.  All aspects of the maximum sterile barrier technique were followed.     A preprocedural time out was performed with all physicians, technologists, and nurses involved with the procedure.     Under CT guidance, an 18 gauge needle was advanced into the collection and a wire was coiled into appropriate position.  A dilator was passed and a 8.5 Fr pigtail drain was advanced over the wire.  Thirty  cc of fluid was aspirated and sent for  microbiology.  The fluid was purulent.     Repeat CT showed interval decrease in size of the collection.  The pigtail was locked and the catheter was secured to the skin with suture and adhesive bandage.  The catheter was placed to  bulb suction     CT dose reduction techniques were utilized during this examination. Dose information is transmitted to the ACR (American College of Radiology) NRDR (National Radiology Data Registry) which includes the Dose Index Registry.     The patient tolerated the procedure well without immediate complication.  Routine post procedure instructions were communicated to the patient and nursing staff and documented in the chart.       ESTIMATED BLOOD LOSS: Less than 5cc.     Impression:    CONCLUSION: Successful drain placement into right hepatic lobe collection     LOCATION:  [facility]     Dictated by (CST): Ainval Espino MD on 4/10/2024 at 12:43 PM      Finalized by (CST): Anival Espino MD on 4/10/2024 at 12:44 PM        PROCEDURE:  CT ABDOMEN+PELVIS (CONTRAST ONLY) (CPT=74177)     COMPARISON:  [facility] , CT, CT ABDOMEN+PELVIS(CPT=74176), 4/09/2024, 9:14 AM.     INDICATIONS:  Liver abcess     TECHNIQUE:  CT scanning was performed from the dome of the diaphragm to the pubic symphysis with non-ionic intravenous contrast material. Post contrast coronal MPR imaging was performed.  Dose reduction techniques were used. Dose information is  transmitted to the ACR (American College of Radiology) NRDR (National Radiology Data Registry) which includes the Dose Index Registry.     PATIENT STATED HISTORY:(As transcribed by Technologist)  Pt with elevatd LFt and fever.      CONTRAST USED:  65cc of Isovue 370     FINDINGS:    LIVER:  Multiloculated collection extending between segments 8, 4A along with 4B and 5 measures 9.9 x 7.0 cm in the axial plane and approximately 7.8 cm cranio caudally.  BILIARY:  No visible dilatation or calcification.    PANCREAS:  No lesion, fluid collection, ductal dilatation, or atrophy.    SPLEEN:  No enlargement or focal lesion.    KIDNEYS:  No mass, obstruction, or calcification.    ADRENALS:  No mass or enlargement.    AORTA/VASCULAR:  No aneurysm or dissection.    RETROPERITONEUM:  No mass or  adenopathy.    BOWEL/MESENTERY:  The appendix is normal.  ABDOMINAL WALL:  No mass or hernia.    URINARY BLADDER:  No visible focal wall thickening, lesion, or calculus.    PELVIC NODES:  No adenopathy.    PELVIC ORGANS:  No visible mass.  Pelvic organs appropriate for patient age.    BONES:  Nonspecific sclerotic foci in the left iliac wing are likely bone islands.  LUNG BASES:  No visible pulmonary or pleural disease.    OTHER:  Negative.       Impression:    CONCLUSION:       Multiloculated collection extending to the right left hepatic lobes is again identified.  This is suspicious for a possible infectious collection.  Atypical cystic lesion or malignancy cannot be excluded.     LOCATION:  [facility]     Dictated by (CST): Bello Damian MD on 4/09/2024 at 5:01 PM      Finalized by (CST): Bello Damian MD on 4/09/2024 at 5:07 PM        ADDENDUM:      The findings were communicated with Dr. Alarcon at 10:37 a.m..  There is   appropriate read back.  Further evaluation with contrast enhanced CT   versus ultrasound was recommended.    Dictated by (CST): Anival Espino MD on 4/09/2024 at 10:41 AM       Finalized by (CST): Anival Espino MD on 4/09/2024 at 10:42 AM                   Signed: 04/09/24 1042 by Kenzie Florian MD  Narrative:    PROCEDURE:  CT ABDOMEN+PELVIS (CPT=74176)     COMPARISON:  None.     INDICATIONS:  elevated lft fever     TECHNIQUE:  Unenhanced multislice CT scanning was performed from the dome of the diaphragm to the pubic symphysis.  Dose reduction techniques were used. Dose information is transmitted to the ACR (American College of Radiology) NRDR (National Radiology  Data Registry) which includes the Dose Index Registry.     PATIENT STATED HISTORY: (As transcribed by Technologist)   Elevated lft fever.         FINDINGS:  Evaluation is limited without IV contrast.  LIVER:  There is a multi-septated/loculated fluid density structure in the right hepatic lobe which measures grossly 7.7 x  6.7 x 7.6 centimeters.  BILIARY:  No visible dilatation or calcification.    PANCREAS:  No lesion, fluid collection, ductal dilatation, or atrophy.    SPLEEN:  No enlargement or focal lesion.    KIDNEYS:  No mass, obstruction, or calcification.    ADRENALS:  No mass or enlargement.    AORTA/VASCULAR:    Unremarkable as seen on non-contrast imaging.  RETROPERITONEUM:  No mass or adenopathy.    BOWEL/MESENTERY:  No dilated bowel or wall thickening.  Normal appendix.  ABDOMINAL WALL:  No mass or hernia.    URINARY BLADDER:  No visible focal wall thickening, lesion, or calculus.    PELVIC NODES:  No adenopathy.    PELVIC ORGANS:  Hysterectomy  BONES:  No bony lesion or fracture.    LUNG BASES:  There is a 5 mm right middle lobe pulmonary nodule (series 3, image 8).  There are additional smaller pulmonary nodules present, for example a 3 mm nodule in the left lower lobe (series 3, image 5).  OTHER:  Negative.       Impression:    CONCLUSION:    1. 7.7 cm multiloculated/septated fluid density structure in the right hepatic lobe, given history findings are suspicious for hepatic abscess.    2. Pulmonary nodules as described, measuring up to 5 millimeters. If patient is at low risk, no further workup is needed. If patient is at high risk, follow up CT chest in one year is recommended.     Findings were communicated with the patient's nurse, Saranya, at 10:15 a.m..  There was appropriate read back.     LOCATION:  [facility]     Dictated by (CST): Anival Espino MD on 4/09/2024 at 10:13 AM      Finalized by (CST): Anival Espino MD on 4/09/2024 at 10:22 AM        ASSESSMENT:  Liver abscess, s/p IR drainage with drain placement 4/10- purulent output.  Fevers and leukocytosis, assume d/t above. No other obvious source of infection.  Elevated LFTs  Thrombocytosis  Near syncopal episode prior to admission. Brain CT and MRI w/o acute process. EEG w/o seizure activity. Orthostatics negative.   HTN, HLD  DM II, hgb A1C 8.9  Pulmonary  nodules    PLAN:  - continue IV Zosyn  - follow blood culture  - follow cultures from liver  - follow temps and wbc  - follow LFTs  - follow abd exam and monitor drain output    Discussed case with patient and patient's family at the bedside.    [facility]  [phone]

## 2024-04-10 NOTE — INTERVAL H&P NOTE
The above referenced H&P was reviewed by Kenzie Florian MD on 4/10/2024, the patient was examined and no significant changes have occurred in the patient's condition since the H&P was performed.  Risks, benefits, alternative treatments and consequences of no treatment were discussed.  We will proceed with procedure as planned.      Kenzie Florian MD  4/10/2024  12:15 PM

## 2024-04-10 NOTE — PROGRESS NOTES
04/10/24 0828 04/10/24 0830 04/10/24 0832   Vitals   /54 147/74 138/55   MAP (mmHg) 79 92 81   BP Location Left arm Left arm Left arm   BP Method Automatic Automatic Automatic   Patient Position Lying Sitting Standing     Orthostatic blood pressure q shift.

## 2024-04-10 NOTE — PROGRESS NOTES
Pt here for image guided drain abscess liver. IV access to right AC saline lock. 0.9 NS IV initiated to maintain patency.  Informed consent obtained by Dr Kenzie Florian. Positioned pt on scanner. 8.5 F drain placed. Obtained biopsy. Specimen sent to Pathology.    Presently denies pain. Tolerated procedure well. Vital signs stable on room air.  Transported pt to Rm 3605 accompanied by Wilberto MARTIN. Bedside report given to Jenny MARTIN.

## 2024-04-10 NOTE — PROGRESS NOTES
[facility]   part of [facility]     Hospitalist Progress Note     Essence Acuña Patient Status:  Inpatient    1950 MRN [id number]   Location [facility] 3NE-A Attending Viktoriya Tsang MD   Hosp Day # 4 PCP No primary care provider on file.     Chief Complaint: syncope    Subjective:     Resting comfortably in bed, denies any fevers/chills overnight. Discussed results of CT chest/pulmonary nodules. Pt was advised to f/u closely with her PCP in outpt setting and repeat CT chest in 3-6 months.    Objective:    Review of Systems:   A comprehensive review of systems was completed; pertinent positive and negatives stated in subjective.    Vital signs:  Temp:  [98.2 °F (36.8 °C)-101.3 °F (38.5 °C)] (P) 98.9 °F (37.2 °C)  Pulse:  [65-81] 67  Resp:  [16-18] 16  BP: (101-151)/(48-78) 128/53  SpO2:  [98 %-100 %] 100 %    Physical Exam:    General: No acute distress  Respiratory: No wheezes, no rhonchi  Cardiovascular: S1, S2, regular rate and rhythm  Abdomen: Soft, Non-tender, non-distended, positive bowel sounds  Neuro: No new focal deficits.   Extremities: No edema      Diagnostic Data:    Labs:  Recent Labs   Lab 24  0841 24  1001 24  1014 04/10/24  0701   WBC 19.7* 16.1* 17.6* 19.0*   HGB 11.8* 12.1 11.7* 10.2*   MCV 80.6 80.1 79.6* 79.5*   .0* 496.0* 550.0* 603.0*       Recent Labs   Lab 24  0841 24  0024 24  1001 24  1014   *  --  256* 294*   BUN 26*  --  20 24*   CREATSERUM 1.16*  --  0.88 1.07*   CA 9.1  --  9.0 9.1   ALB 2.4*  --  2.4* 2.4*   *  --  135* 135*   K 3.4* 5.0 4.7 4.2     --  108 105   CO2 23.0  --  22.0 23.0   ALKPHO 178*  --  245* 275*   AST 74*  --  64* 60*   *  --  122* 127*   BILT 0.7  --  0.6 0.9   TP 7.2  --  7.2 7.1       CrCl cannot be calculated (Unknown ideal weight.).    Recent Labs   Lab 24  0841   TROPHS 4       No results for input(s): \"PTP\", \"INR\" in the last 168 hours.     Microbiology    No  results found for this visit on 04/06/24.      Imaging: Reviewed in Epic.    Medications:    piperacillin-tazobactam  3.375 g Intravenous Q8H    insulin degludec  5 Units Subcutaneous Nightly    insulin aspart  1-68 Units Subcutaneous TID CC    insulin aspart  1-10 Units Subcutaneous TID AC and HS    amLODIPine Besylate-Valsartan  1 tablet Oral Daily    atenolol  25 mg Oral BID    atorvastatin  40 mg Oral QPM    pantoprazole  40 mg Oral Daily       Assessment & Plan:      #Hepatic abscess  #fevers  #Transaminitis  #Leukocytosis   - CTAP  reviewed > 7.7 multiloculated/septated fluid density structure in R hepatic lobe suspicious for abscess  - empiric zoyn  - IR guided drainage with cultures planned for later today  - trend LFT's  - ID consulted    #Thrombocytosis, suspect reactive d/t above  - follow CBC    #Pulmonary nodules, incidental finding on CTAP  - CT chest reviewed > pt advised outpt f/u and repeat CT chest in 3-6 months    #Syncope, suspect d/t above infection  - MRI brain and EEG without concerning findings or pathology  - IVF  - neurology signed off    #HTN  - PTA amlodipine-valsartan, atenolol    #HLD  - statin    #GERD  - PPI    #DM2 with hyperglycemia  - hyperglycemia protocol  - degludec 5u at bedtime started, will adjust pending response  - ICF 1:20, carb ratio 1:10      Shreya Mcmullen DO      Supplementary Documentation:     Quality:  DVT Mechanical Prophylaxis:   SCDs, Early ambuation  DVT Pharmacologic Prophylaxis   Medication   None                Code Status: Not on file  John: No urinary catheter in place  John Duration (in days):   Central line:    HUGO:     Discharge is dependent on: progress  At this point Ms. Acuña is expected to be discharge to: tbd    The 21st Century Cures Act makes medical notes like these available to patients in the interest of transparency. Please be advised this is a medical document. Medical documents are intended to carry relevant information, facts as  evident, and the clinical opinion of the practitioner. The medical note is intended as peer to peer communication and may appear blunt or direct. It is written in medical language and may contain abbreviations or verbiage that are unfamiliar.

## 2024-04-10 NOTE — TREATMENT PLAN
Assumed pt care this morning at 0730.  Pt is A&Ox4, following commands.   Pt on room air, VSS.  NSR on tele.   Pt denies pain.  Pt one person assist.  Pt on carb control diet. Tolerating diet.   R AC  saline locked.    Bed in lowest position, call light in reach.    1245- R Hepatic bulb drain placed, pt returned to unit and vital signs are stable, irrigated bulb with 10 mL, 120 mL output.

## 2024-04-10 NOTE — PROCEDURES
[facility]   part of [facility]  Procedure Note    Essence Acuña Patient Status:  Inpatient    1950 MRN [id number]   Location [facility] 3NE-A Attending Shreya Mcmullen,    Hosp Day # 4 PCP No primary care provider on file.     Procedure: Liver abscess drain placement    Pre-Procedure Diagnosis:  Liver abscess    Post-Procedure Diagnosis: Same    Anesthesia:  Local and Sedation    Findings:    Successful placement of 8.5 pigtail into right liver abscess. Return of pus.    Specimens: Sent for culture    Blood Loss:  <5ml    Tourniquet Time: 0  Complications:  None  Drains:  As above     Secondary Diagnosis:  None    Kenzie Florian MD  4/10/2024

## 2024-04-10 NOTE — PAYOR COMM NOTE
--------------  ADMISSION REVIEW     Payor: [employer]  Subscriber #:  [id number]  Authorization Number: [id number]    Admit date: 4/6/24  Admit time:  1:23 PM       History   73-year-old female, history of diabetes hypertension hyperlipidemia, presents via EMS with her  with complaints of near syncope, near fall and some dizziness.  Patient 3 days ago she stood up and felt lightheaded, states anything happened today.   states did not fall the ground but patient states that her son found her lying on the floor.  She denies any pain other than some right lateral rib pain which has been having for the last couple weeks after she fell into her dresser.  Has not seen any medical care for this as of yet.  She is wearing a rib belt for that.  States she has no pain otherwise.  No headache.  No blurred vision but she states when she gets lightheaded that she sees some white across her visual fields.  No neck pain.  No chest pain cough or shortness of breath.  No abdominal pain or back pain nausea vomiting diarrhea numbness or acute focal weakness.  NIH is 0    Neurological:  Positive for dizziness and syncope. Negative for seizures, facial asymmetry, speech difficulty, numbness and headaches.     ED Triage Vitals [04/06/24 0831]   BP (!) 179/65   Pulse 90   Resp 17   SpO2 95 %     Physical Exam  HENT:      Head: Normocephalic and atraumatic.   Eyes:      General: No visual field deficit.     Extraocular Movements: Extraocular movements intact.      Right eye: Normal extraocular motion and no nystagmus.      Left eye: Normal extraocular motion and no nystagmus.      Pupils: Pupils are equal, round, and reactive to light.   Cardiovascular:      Rate and Rhythm: Normal rate and regular rhythm.      Heart sounds: Normal heart sounds.   Pulmonary:      Effort: Pulmonary effort is normal. No respiratory distress.      Breath sounds: Normal breath sounds.   Abdominal:      General: There is no distension.       Palpations: Abdomen is soft.      Tenderness: There is no abdominal tenderness.   Musculoskeletal:         General: Normal range of motion.      Cervical back: Normal range of motion and neck supple. No rigidity.   Skin:     General: Skin is warm and dry.   Neurological:      Mental Status: She is alert.      GCS: GCS eye subscore is 4. GCS verbal subscore is 5. GCS motor subscore is 6.      Cranial Nerves: No cranial nerve deficit, dysarthria or facial asymmetry.      Sensory: No sensory deficit.      Motor: No weakness.      Coordination: Coordination normal.   Psychiatric:         Mood and Affect: Mood normal.         Behavior: Behavior normal.     Labs Reviewed   COMP METABOLIC PANEL (14) - Abnormal; Notable for the following components:       Result Value    Glucose 179 (*)     Sodium 134 (*)     Potassium 3.4 (*)     BUN 26 (*)     Creatinine 1.16 (*)     eGFR-Cr 50 (*)     AST 74 (*)      (*)     Alkaline Phosphatase 178 (*)     Albumin 2.4 (*)     Globulin  4.8 (*)     A/G Ratio 0.5 (*)     All other components within normal limits   URINALYSIS, ROUTINE - Abnormal; Notable for the following components:    Glucose Urine >1000 (*)     Ketones Urine Trace (*)     Blood Urine Trace (*)     Protein Urine 50 (*)     Squamous Epi. Cells Few (*)     Hyaline Casts Present (*)     All other components within normal limits   MAGNESIUM - Abnormal; Notable for the following components:    Magnesium 1.5 (*)     All other components within normal limits   POCT GLUCOSE - Abnormal; Notable for the following components:    POC Glucose 173 (*)     All other components within normal limits   CBC W/ DIFFERENTIAL - Abnormal; Notable for the following components:    WBC 19.7 (*)     HGB 11.8 (*)     .0 (*)     Neutrophil Absolute Prelim 17.34 (*)     Neutrophil Absolute 17.34 (*)     Lymphocyte Absolute 0.94 (*)     Monocyte Absolute 1.27 (*)    EKG    Rate, intervals and axes as noted on EKG Report.  Rate:  84  Rhythm: Sinus Rhythm  Reading: EKG sinus rhythm 84 bpm.  Normal axis.  No ST elevations.  Intervals are stable.  There are no previous EKGs to compare to    CT BRAIN OR HEAD (99646)  Result Date: 4/6/2024  CONCLUSION:  No acute intracranial abnormality identified.  Minimal age-indeterminate microvascular ischemic changes in the cerebral white matter. If there is clinical concern for acute ischemia/infarction, an MRI of the brain would be recommended for further evaluation.  LOCATION:  [facility]   Dictated by (CST): Ken Rivera MD on 4/06/2024 at 9:53 AM     Finalized by (CST): Ken Rivera MD on 4/06/2024 at 9:53 AM       Admission disposition: 4/6/2024  2:00 PM       Disposition and Plan     Clinical Impression:  1. Syncope and collapse    2. Contusion of rib on right side, initial encounter    3. Leukocytosis, unspecified type    4. Hypomagnesemia      Disposition:  Admit  4/6/2024  2:00 pm       History and Physical   History of Present Illness:    Essence Acuña is a 73 year old female with history of diabetes type 2 hypertension hyperlipidemia presents emergency room today with an episode of near syncope.  Patient states that she stood up and felt lightheaded.   states she did not fall to the ground but patient thinks her son found her lying on the floor.  She is complaining of discomfort in the right lateral rib 5 6 out of 10 worse with palpation.  She denies any headache dizziness blurred vision chest pain cough shortness of breath.  NIH is 0.  Patient describes a similar episode in the past with blurry vision slight confusion and not remembering much about the details of the episode.  She denies history of seizures.    Lab 04/06/24  0841   RBC 4.48   HGB 11.8*   HCT 36.1   MCV 80.6   MCH 26.3   MCHC 32.7   RDW 14.8   NEPRELIM 17.34*   WBC 19.7*   .0*      Lab 04/06/24  0841   *   BUN 26*   CREATSERUM 1.16*   EGFRCR 50*   CA 9.1   ALB 2.4*   *   K 3.4*      CO2 23.0    ALKPHO 178*   AST 74*   *   BILT 0.7   TP 7.2      Lab 04/06/24  0841   TROPHS 4    Assessment & Plan:       # 73 years old female with history hypertension diabetes presents with near syncopal episode  -Rule out hypoglycemia  -Rule out orthostatic hypotension  -Cardiac telemetry monitoring  -Neurochecks every 4 hours     # EEG     # Moderately elevated liver enzymes will monitor closely no evidence of obstruction no abdominal pain     # Leukocytosis with WBC of 19 mild left shift and mild thrombocytosis of 459  -UA normal  -Chest x-ray no evidence of pneumonia         4/7/24  Temp:  [98 °F (36.7 °C)-99.4 °F (37.4 °C)] 98.4 °F (36.9 °C)  Pulse:  [63-79] 79  Resp:  [16-25] 16  BP: (131-150)/(56-88) 148/68  SpO2:  [94 %-99 %] 98 %     Physical Exam:    Respiratory: No wheezes, no rhonchi  Cardiovascular: S1, S2, regular rate and rhythm  Abdomen: Soft, Non-tender, non-distended, positive bowel sounds  Neuro: No new focal deficits.   Extremities: No edema  Lab 04/06/24  0841   WBC 19.7*   HGB 11.8*   MCV 80.6   .0*      Lab 04/06/24  0841 04/07/24  0024   *  --    BUN 26*  --    CREATSERUM 1.16*  --    CA 9.1  --    ALB 2.4*  --    *  --    K 3.4* 5.0   Assessment & Plan:       ## 73 years old female with history hypertension diabetes presents with near syncopal episode  -Rule out hypoglycemia  -Rule out orthostatic hypotension  -Cardiac telemetry monitoring  -Neurochecks every 4 hours     # EEG     # Moderately elevated liver enzymes will monitor closely no evidence of obstruction no abdominal pain     # Leukocytosis with WBC of 19 mild left shift and mild thrombocytosis of 459  -UA normal  -Chest x-ray no evidence of pneumonia      4/8/24  Temp:  [97.5 °F (36.4 °C)-99.6 °F (37.6 °C)] 97.5 °F (36.4 °C)  Pulse:  [58-82] 58  Resp:  [16-18] 18  BP: (137-162)/(50-69) 137/69  SpO2:  [95 %-99 %] 98 %     Physical Exam:    Respiratory: No wheezes, no rhonchi  Cardiovascular: S1, S2, regular rate  and rhythm  Abdomen: Soft, Non-tender, non-distended, positive bowel sounds  Neuro: No new focal deficits.   Extremities: No edema  Lab 04/06/24  0841 04/07/24  1001   WBC 19.7* 16.1*   HGB 11.8* 12.1   MCV 80.6 80.1   .0* 496.0*      Lab 04/06/24  0841 04/07/24  0024 04/07/24  1001   *  --  256*   BUN 26*  --  20   CREATSERUM 1.16*  --  0.88   CA 9.1  --  9.0   ALB 2.4*  --  2.4*   *  --  135*   K 3.4* 5.0 4.7     --  108   CO2 23.0  --  22.0   ALKPHO 178*  --  245*   AST 74*  --  64*   *  --  122*   BILT 0.7  --  0.6   TP 7.2  --  7.2   Assessment & Plan:       #73 years old female with history hypertension diabetes presents with near syncopal episode  -Rule out hypoglycemia  -Rule out orthostatic hypotension  -Cardiac telemetry monitoring negative  -Neurochecks every 4 hours negative  -MRI brain and eeg negative     # EEG neg     # Moderately elevated liver enzymes will monitor closely no evidence of obstruction no abdominal pain  -outpt liver us     # Leukocytosis with WBC of 19 mild left shift and mild thrombocytosis of 459  -UA normal  -Chest x-ray no evidence of pneumonia         4/9/24  Temp:  [98.3 °F (36.8 °C)-101.1 °F (38.4 °C)] 98.5 °F (36.9 °C)  Pulse:  [63-82] 75  Resp:  [18] 18  BP: (135-173)/(52-87) 138/52  SpO2:  [96 %-98 %] 98 %     Lab 04/06/24  0841 04/07/24  1001 04/08/24  1014   WBC 19.7* 16.1* 17.6*   HGB 11.8* 12.1 11.7*   MCV 80.6 80.1 79.6*   .0* 496.0* 550.0*      Lab 04/06/24  0841 04/07/24  0024 04/07/24  1001 04/08/24  1014   *  --  256* 294*   BUN 26*  --  20 24*   CREATSERUM 1.16*  --  0.88 1.07*   CA 9.1  --  9.0 9.1   ALB 2.4*  --  2.4* 2.4*   *  --  135* 135*   K 3.4* 5.0 4.7 4.2     --  108 105   CO2 23.0  --  22.0 23.0   ALKPHO 178*  --  245* 275*   AST 74*  --  64* 60*   *  --  122* 127*   BILT 0.7  --  0.6 0.9   TP 7.2  --  7.2 7.1    Medications:    piperacillin-tazobactam  3.375 g Intravenous Q8H    insulin  degludec  5 Units Subcutaneous Nightly    insulin aspart  1-68 Units Subcutaneous TID CC    insulin aspart  1-10 Units Subcutaneous TID AC and HS    amLODIPine Besylate-Valsartan  1 tablet Oral Daily    atenolol  25 mg Oral BID    atorvastatin  40 mg Oral QPM    pantoprazole  40 mg Oral Daily       Assessment & Plan:       #Hepatic abscess  #fevers  #Transaminitis  #Leukocytosis   - CTAP  reviewed > 7.7 multiloculated/septated fluid density structure in R hepatic lobe suspicious for abscess  - empiric zoyn  - IR guided drainage with cultures planned for later today/tomorrow pending schedule  - CTAP with contrast ordered  - trend LFT's  - ID consulted     #Thrombocytosis, suspect reactive d/t above  - follow CBC     #Pulmonary nodules, incidental finding on CTAP  - CT chest ordered     #Syncope, suspect d/t above infection  - MRI brain and EEG without concerning findings or pathology  - IVF  - neurology signed off     #HTN  - PTA amlodipine-valsartan, atenolol     #HLD  - statin     #GERD  - PPI     #DM2 with hyperglycemia  - hyperglycemia protocol  - start low dose degludec tonight 5u at bedtime  - ICF 1:40, carb ratio 1:10        INFECTIOUS DISEASE  Reason for Consultation:  Possible liver abscess     History of Present Illness:  Essence Acuña is a 73 year old female with a history of HTN, HLD and DM. Patient presented 4/6 with near syncopal episode and dizziness.      Patient started having fevers 4/8. Blood culture was drawn and CT a/p was done showing possible liver abscess. Patient reports RUQ pain x 1 week. However, she felt this was secondary to a fall. Per patient's family at the bedside, she hit the area against the bedside table about 1 week ago and the pain started after that. Denies any fevers or chills prior to admission. Denies any nausea, vomiting or diarrhea. Reports stool with pasty/loose at baseline and unchanged from baseline. Denies any decrease in appetite or po intake. Denies any abx prior to  admission. Denies any dysuria. Denies any SOB or cough. Per patient's family, traveled to the [facility] in January.     ASSESSMENT:  Suspected liver abscess (R hepatic lobe with multiloculated structure)  Fevers and leukocytosis, assume d/t above. No other obvious source of infection.  Elevated LFTs  Thrombocytosis  Near syncopal episode prior to admission. Brain CT and MRI w/o acute process. EEG w/o seizure activity. Orthostatics negative.   HTN, HLD  DM II, hgb A1C 8.9  Pulmonary nodules     PLAN:  - continue IV Zosyn  - follow blood culture  - follow temps and wbc  - follow LFTs  - noted plans for CT a/p with contrast to better evaluate liver. Agree with plans for CT guided drainage by IR.        MEDICATIONS ADMINISTERED IN LAST 1 DAY:  acetaminophen (Tylenol) tab 650 mg       Date Action Dose Route User    4/10/2024 0511 Given 650 mg Oral Natalee Cervantes RN    4/9/2024 1504 Given 650 mg Oral Smitha Aguilar RN          amLODIPine Besylate-Valsartan (EXFORGE)  MG per tab 1 tablet - patient supplied       Date Action Dose Route User    4/9/2024 0937 Given 1 tablet Oral Smitha Aguilar RN          atenolol (Tenormin) tab 25 mg       Date Action Dose Route User    4/10/2024 0609 Given 25 mg Oral Natalee Cervantes RN    4/9/2024 1753 Given 25 mg Oral Smitha Aguilar RN          atorvastatin (Lipitor) tab 40 mg       Date Action Dose Route User    4/9/2024 1753 Given 40 mg Oral Smitha Aguilar RN          insulin aspart (NovoLOG) 100 Units/mL FlexPen 1-5 Units       Date Action Dose Route User    4/9/2024 1324 Given 5 Units Subcutaneous (Left Upper Arm) Smitha Aguilar RN          insulin aspart (NovoLOG) 100 Units/mL FlexPen 1-10 Units       Date Action Dose Route User    4/10/2024 0600 Given 2 Units Subcutaneous (Right Upper Arm) Natalee Cervantes RN    4/9/2024 2119 Given 2 Units Subcutaneous (Left Upper Arm) Natalee Cervantes RN    4/9/2024 1753 Given 4 Units Subcutaneous (Right Upper Arm)  Smitha Aguilar RN          insulin degludec 100 units/mL flextouch 5 Units       Date Action Dose Route User    4/9/2024 2119 Given 5 Units Subcutaneous (Left Upper Arm) Natalee Cervantes RN      Date Action Dose Route User    4/9/2024 0937 Given 40 mg Oral Smitha Aguilar RN          piperacillin-tazobactam (Zosyn) 3.375 g in dextrose 5% 100 mL IVPB-ADDV       Date Action Dose Route User    4/10/2024 0430 New Bag 3.375 g Intravenous Natalee Cervantes RN    4/9/2024 1905 New Bag 3.375 g Intravenous Smitha Aguilar RN    4/9/2024 1159 New Bag 3.375 g Intravenous Smitha Aguilar RN            Vitals (last day)       Date/Time Temp Pulse Resp BP SpO2 Weight O2 Device O2 Flow Rate (L/min) [facility]    04/10/24 0500 101.3 °F (38.5 °C) -- 16 -- 100 % -- None (Room air) -- JA    04/09/24 2317 98.9 °F (37.2 °C) 67 18 128/53 -- -- -- -- EM    04/09/24 2033 99.9 °F (37.7 °C) 75 18 151/78 -- -- -- -- EM    04/09/24 0400 98.4 °F (36.9 °C) 66 18 135/57 97 % -- None (Room air) -- NV    04/09/24 0000 100.9 °F (38.3 °C) 79 18 145/67 96 % -- None (Room air) -- NV

## 2024-04-11 LAB
ALBUMIN SERPL-MCNC: 1.9 G/DL (ref 3.4–5)
ALBUMIN/GLOB SERPL: 0.4 {RATIO} (ref 1–2)
ALP LIVER SERPL-CCNC: 231 U/L
ALT SERPL-CCNC: 103 U/L
ANION GAP SERPL CALC-SCNC: 8 MMOL/L (ref 0–18)
AST SERPL-CCNC: 53 U/L (ref 15–37)
BASOPHILS # BLD AUTO: 0.03 X10(3) UL (ref 0–0.2)
BASOPHILS NFR BLD AUTO: 0.3 %
BILIRUB SERPL-MCNC: 0.6 MG/DL (ref 0.1–2)
BUN BLD-MCNC: 18 MG/DL (ref 9–23)
CALCIUM BLD-MCNC: 8.6 MG/DL (ref 8.5–10.1)
CHLORIDE SERPL-SCNC: 107 MMOL/L (ref 98–112)
CO2 SERPL-SCNC: 21 MMOL/L (ref 21–32)
CREAT BLD-MCNC: 0.91 MG/DL
EGFRCR SERPLBLD CKD-EPI 2021: 67 ML/MIN/1.73M2 (ref 60–?)
EOSINOPHIL # BLD AUTO: 0.05 X10(3) UL (ref 0–0.7)
EOSINOPHIL NFR BLD AUTO: 0.4 %
ERYTHROCYTE [DISTWIDTH] IN BLOOD BY AUTOMATED COUNT: 14.8 %
GLOBULIN PLAS-MCNC: 4.3 G/DL (ref 2.8–4.4)
GLUCOSE BLD-MCNC: 150 MG/DL (ref 70–99)
GLUCOSE BLD-MCNC: 223 MG/DL (ref 70–99)
GLUCOSE BLD-MCNC: 227 MG/DL (ref 70–99)
GLUCOSE BLD-MCNC: 227 MG/DL (ref 70–99)
GLUCOSE BLD-MCNC: 251 MG/DL (ref 70–99)
GLUCOSE BLD-MCNC: 345 MG/DL (ref 70–99)
HCT VFR BLD AUTO: 31.1 %
HGB BLD-MCNC: 10.5 G/DL
IMM GRANULOCYTES # BLD AUTO: 0.09 X10(3) UL (ref 0–1)
IMM GRANULOCYTES NFR BLD: 0.8 %
LYMPHOCYTES # BLD AUTO: 0.68 X10(3) UL (ref 1–4)
LYMPHOCYTES NFR BLD AUTO: 6 %
MCH RBC QN AUTO: 26.2 PG (ref 26–34)
MCHC RBC AUTO-ENTMCNC: 33.8 G/DL (ref 31–37)
MCV RBC AUTO: 77.6 FL
MONOCYTES # BLD AUTO: 0.55 X10(3) UL (ref 0.1–1)
MONOCYTES NFR BLD AUTO: 4.8 %
NEUTROPHILS # BLD AUTO: 10.02 X10 (3) UL (ref 1.5–7.7)
NEUTROPHILS # BLD AUTO: 10.02 X10(3) UL (ref 1.5–7.7)
NEUTROPHILS NFR BLD AUTO: 87.7 %
OSMOLALITY SERPL CALC.SUM OF ELEC: 291 MOSM/KG (ref 275–295)
PLATELET # BLD AUTO: 624 10(3)UL (ref 150–450)
POTASSIUM SERPL-SCNC: 4.7 MMOL/L (ref 3.5–5.1)
PROT SERPL-MCNC: 6.2 G/DL (ref 6.4–8.2)
RBC # BLD AUTO: 4.01 X10(6)UL
SODIUM SERPL-SCNC: 136 MMOL/L (ref 136–145)
WBC # BLD AUTO: 11.4 X10(3) UL (ref 4–11)

## 2024-04-11 PROCEDURE — 99232 SBSQ HOSP IP/OBS MODERATE 35: CPT | Performed by: INTERNAL MEDICINE

## 2024-04-11 RX ORDER — INSULIN DEGLUDEC 100 U/ML
7 INJECTION, SOLUTION SUBCUTANEOUS NIGHTLY
Status: DISCONTINUED | OUTPATIENT
Start: 2024-04-11 | End: 2024-04-12

## 2024-04-11 NOTE — CM/SW NOTE
Care Progression Note:  Length of stay: 5  GMLOS: 2.4  Avoidable Delays:   Code Status: FULL    Acute Medical Issue/Factors:   Syncope and collapse   Contusion of rib on right side, i     Leukocytosis, unspecified type          Discharge Barriers: To IR for drain placement for liver abscess on 4/10.  Purulent drainage. ID consulted, await micro results. IV ABX. VSS,   Expected discharge date: pending clinical course.    Expected next site of care: From home with family.  Ambulating independently. May need HH if need long course of IV ABX.     / available for discharge planning.     Ann Brown, YANCY RN, CM

## 2024-04-11 NOTE — PROGRESS NOTES
04/10/24 1925 04/10/24 1927 04/10/24 1928   Vitals   Pulse 64 60 64   Heart Rate Source Monitor  --   --    Resp 20  --   --    /68 145/61 137/59   MAP (mmHg) 82 84 82   BP Location Left arm Left arm Left arm   BP Method Automatic Automatic Automatic   Patient Position Lying Sitting Standing     Orthostatic QShift

## 2024-04-11 NOTE — CM/SW NOTE
04/11/24 1200   CM/SW Referral Data   Referral Source Physician   Reason for Referral Discharge planning   Informant Patient;Spouse/Significant Other     Sw met with pt , her  and her brother-in-law to discuss eventual dc planning.  Per ID- pt will need IV abs at dc. Cultures still pending.    Discussed HHC plan for IV abs.  Referral sent in aidin for HHC and to [facility] for IV abs and supplies.    Will meet with pt re: HH choice once responses received.  Will send updated IV ab orders once known to [facility].  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    Yvonne Segundo LCSW  /Discharge Planner

## 2024-04-11 NOTE — PROGRESS NOTES
04/11/24 1350 04/11/24 1351 04/11/24 1352   Vitals   Pulse 68 73 74   /54  --  113/59   MAP (mmHg) 76  --  65      04/11/24 1353   Vitals   Pulse 67   /60   MAP (mmHg) 81

## 2024-04-11 NOTE — PROGRESS NOTES
Assumed care at 0030  A&Ox4  Room air  SB on tele  Orthostatics Qshift  QID accucheck, carb controlled diet  Up w/ standby  R AC saline locked  RUQ LEOPOLDO drain in place, Q8 flush w/ 10cc NS  Q8 Zosyn  Neurochecks Q4, NIH daily  Call light within reach, safety precautions maintained  POC ongoing

## 2024-04-11 NOTE — PROGRESS NOTES
04/11/24 1350 04/11/24 1352 04/11/24 1353   Vitals   /54 113/59 125/60   MAP (mmHg) 76 65 81   BP Location Left arm Left arm Left arm   BP Method Automatic Automatic Automatic   Patient Position Lying Sitting Standing     Ortho BP Q shift per order. See managed orders for more details.

## 2024-04-11 NOTE — PROGRESS NOTES
[facility]   part of [facility]  Progress Note      Essence Acuña Patient Status:  Inpatient    1950 MRN [id number]   Location [facility] 3NE-A Attending Shreya Mcmullen, DO   Hosp Day # 5 PCP No primary care provider on file.       Subjective:   Feels much better today per pt and family    Objective:   Dressing c/d/i    Vital Signs:  Blood pressure 131/61, pulse 58, temperature 98.3 °F (36.8 °C), temperature source Oral, resp. rate 16, weight 127 lb, SpO2 98%.    Input/Output:    Intake/Output Summary (Last 24 hours) at 2024 1702  Last data filed at 2024 1132  Gross per 24 hour   Intake 30 ml   Output 205 ml   Net -175 ml     24 hour drain output: 385 ml serous    Results:   Labs:  Lab Results   Component Value Date    WBC 11.4 2024    RBC 4.01 2024    HGB 10.5 2024    HCT 31.1 2024    MCV 77.6 2024    MCH 26.2 2024    MCHC 33.8 2024    RDW 14.8 2024    .0 2024       Microbiology:  Pending    Assessment and Plan:   74 yo F liver abscess s/p drain placement  -  Excellent output.  Cont drain until consistently <10-15 ml output daily.  This can be done as outpatient    Jose Luis Wilburn MD  2024  5:02 PM

## 2024-04-11 NOTE — PLAN OF CARE
Assumed care of 72 y/o female @1930  A/Ox4, RA  SB-Tele  Orthostatics QS  Carb cont diet, QID accucheck  Denies pain  RAC PIV-infusing Zosyn Q8 IVPB  R Hepatic bulb drain in place, Q8 flush w/10 ml NS   Safety precautions maintained and all needs met            Problem: Diabetes/Glucose Control  Goal: Glucose maintained within prescribed range  Description: INTERVENTIONS:  - Monitor Blood Glucose as ordered  - Assess for signs and symptoms of hyperglycemia and hypoglycemia  - Administer ordered medications to maintain glucose within target range  - Assess barriers to adequate nutritional intake and initiate nutrition consult as needed  - Instruct patient on self management of diabetes  Outcome: Progressing

## 2024-04-11 NOTE — PLAN OF CARE
Assumed care at 0730.  A&O 4.  Room air.  Fluids- Scheduled Zosyn Q 8. See MAR for more details.  Carb control diet.   Accuchecks. See MAR for insulin administration details. Education provided about waiting to eat until after accucheck is taken both verbally and written on board in the room.   Tele- NSR/SB.  No VTE.  Ortho BP Q shift. See managed orders and notes for more details.  Neuro assessments Q 4. See managed orders and flowsheets for more details.  LEOPOLDO drain to RUQ with an order to flush Q8. See managed orders for more details.   Anaerobic, Aerobic and blood cultures pending. See managed orders and results for more details.   Independent after set up to the bathroom.      Pt oriented to the room, safety prec initiated, bed is in the lowest position and call light within reach. Pt and family at bedside updated on the plan of care. All needs met at this time.     Problem: Diabetes/Glucose Control  Goal: Glucose maintained within prescribed range  Description: INTERVENTIONS:  - Monitor Blood Glucose as ordered  - Assess for signs and symptoms of hyperglycemia and hypoglycemia  - Administer ordered medications to maintain glucose within target range  - Assess barriers to adequate nutritional intake and initiate nutrition consult as needed  - Instruct patient on self management of diabetes  Outcome: Progressing     Problem: Patient/Family Goals  Goal: Patient/Family Long Term Goal  Description: Patient's Long Term Goal: discharge   Interventions:  - complete MRI   - neuro c/s  - ortho BP q shift  - See additional Care Plan goals for specific interventions  Outcome: Progressing  Goal: Patient/Family Short Term Goal  Description: Patient's Short Term Goal: to prevent further syncopal episodes   Interventions:   - neuro & cards consults  - See additional Care Plan goals for specific interventions  Outcome: Progressing

## 2024-04-11 NOTE — CM/SW NOTE
Per [facility]:    \"Pt has a [employer] medicare plan.   Drug copay for zosyn is $80/ week   Other pharmacy charges applied to [employer] where coverage is 85% until oop of $1300 is met then 100% covered except for drug copay. Pt has met $371 toward oop already. \"    [facility] list given to pt and son.  Will follow up on choice.    Yvonne Segundo LCSW  /Discharge Planner

## 2024-04-11 NOTE — PROGRESS NOTES
[facility]   part of [facility] ID PROGRESS NOTE    Essence Acuña Patient Status:  Inpatient    1950 MRN [id number]   Location [facility] 3NE-A Attending Shreya Mcmullen, DO   Hosp Day # 5 PCP No primary care provider on file.     Abx: IV Zosyn D#2    Subjective: Patient seen and examined today. Reports some pain at the drain site, otherwise no complaints. No further fevers. WBC much better.    Allergies:  No Known Allergies    Medications:    Current Facility-Administered Medications:     ondansetron (Zofran) 4 MG/2ML injection 4 mg, 4 mg, Intravenous, Q6H PRN    piperacillin-tazobactam (Zosyn) 3.375 g in dextrose 5% 100 mL IVPB-ADDV, 3.375 g, Intravenous, Q8H    insulin degludec 100 units/mL flextouch 5 Units, 5 Units, Subcutaneous, Nightly    insulin aspart (NovoLOG) 100 Units/mL FlexPen 1-68 Units, 1-68 Units, Subcutaneous, TID CC    insulin aspart (NovoLOG) 100 Units/mL FlexPen 1-10 Units, 1-10 Units, Subcutaneous, TID AC and HS    amLODIPine Besylate-Valsartan (EXFORGE)  MG per tab 1 tablet - patient supplied, 1 tablet, Oral, Daily    atenolol (Tenormin) tab 25 mg, 25 mg, Oral, BID    atorvastatin (Lipitor) tab 40 mg, 40 mg, Oral, QPM    pantoprazole (Protonix) DR tab 40 mg, 40 mg, Oral, Daily    glucose (Dex4) 15 GM/59ML oral liquid 15 g, 15 g, Oral, Q15 Min PRN **OR** glucose (Glutose) 40% oral gel 15 g, 15 g, Oral, Q15 Min PRN **OR** glucose-vitamin C (Dex-4) chewable tab 4 tablet, 4 tablet, Oral, Q15 Min PRN **OR** dextrose 50% injection 50 mL, 50 mL, Intravenous, Q15 Min PRN **OR** glucose (Dex4) 15 GM/59ML oral liquid 30 g, 30 g, Oral, Q15 Min PRN **OR** glucose (Glutose) 40% oral gel 30 g, 30 g, Oral, Q15 Min PRN **OR** glucose-vitamin C (Dex-4) chewable tab 8 tablet, 8 tablet, Oral, Q15 Min PRN    acetaminophen (Tylenol) tab 650 mg, 650 mg, Oral, Q6H PRN    Review of Systems:  Completed. See pertinent positives and negatives above.    Physical Exam:  Vital  signs: Blood pressure 140/58, pulse 70, temperature 98.5 °F (36.9 °C), temperature source Oral, resp. rate 18, weight 127 lb (57.6 kg), SpO2 98%.    General: Alert, oriented, NAD, on room air.   HEENT: Moist mucous membranes.   Neck: No lymphadenopathy.  Supple.  Cardiovascular: RRR  Respiratory: Clear to auscultation bilaterally.  No wheezes. No rhonchi.  Abdomen: Soft, + Drain to R abd with mainly serous appearing drainage, nondistended.   Musculoskeletal: No edema noted  Integument: No lesions. No erythema.    Laboratory Data:  Recent Labs   Lab 04/11/24  0606   RBC 4.01   HGB 10.5*   HCT 31.1*   MCV 77.6*   MCH 26.2   MCHC 33.8   RDW 14.8   NEPRELIM 10.02*   WBC 11.4*   .0*     Recent Labs   Lab 04/08/24  1014 04/10/24  0701 04/10/24  1846 04/11/24  0608   * 223*  --  227*   BUN 24* 20  --  18   CREATSERUM 1.07* 0.99  --  0.91   CA 9.1 8.9  --  8.6   ALB 2.4* 2.0*  --  1.9*   * 135*  --  136   K 4.2 3.5 5.0 4.7    106  --  107   CO2 23.0 23.0  --  21.0   ALKPHO 275* 256*  --  231*   AST 60* 77*  --  53*   * 120*  --  103*   BILT 0.9 0.9  --  0.6   TP 7.1 6.4  --  6.2*       Microbiology: Reviewed in EMR    Radiology: Reviewed.    PROCEDURE:  CT DRAIN ABSCESS LIVER (CPT=49405)     COMPARISON:  [facility] , CT, CT ABDOMEN+PELVIS(CONTRAST ONLY)(CPT=74177), 4/09/2024, 4:31 PM.     INDICATIONS:  liver abscess     DESCRIPTION OF PROCEDURE:  The procedure of aspiration was discussed in detail with the patient.  This includes review of benefits, alternatives and risks. Discussion of risks, included, but was not limited to infection, bleeding and organ / nerve  injury. The patient voiced understanding and wished to proceed.  Witnessed verbal and written informed consent was obtained.  Time out was performed by the staff.     TECHNIQUE:     Prior to the procedure, I discussed with the patient and/or legal representative the potential benefits, risks, and side effects of this procedure, the  likelihood of the patient achieving goals; and the potential problems that might occur during  recuperation.  I discussed reasonable alternatives to the procedure, including risks, benefits, steps to prevent infection and side effects related to the alternatives, and risks related to not receiving this procedure. A witnessed verbal and signed  consent was obtained and documented in the patient's chart.     IV was checked and maintained by the nurse. Moderate conscious sedation was performed under continuous pulse oximetry and cardiac monitoring under my direct supervision.  The radiology nurse was in attendance throughout the exam. Moderate conscious  sedation of 2 mg Versed and 100 mcg of Fentanyl was given.  Patient was assessed and monitoring of oxygen saturation, heart rate, and blood pressure by the nursing staff and myself during the exam for a total intraservice time of 21 minutes of conscious  sedation time.     The patient was placed supine on the CT table.  Initial imaging was obtained to find the right hepatic lobe liver abscess. The right upper abdomen was prepped with chlorhexidine and covered with a body drape in the usual sterile fashion.  All operators  present for the case performed standard pre-procedural prep including hand washing, sterile gloves, mask, and cap.  All aspects of the maximum sterile barrier technique were followed.     A preprocedural time out was performed with all physicians, technologists, and nurses involved with the procedure.     Under CT guidance, an 18 gauge needle was advanced into the collection and a wire was coiled into appropriate position.  A dilator was passed and a 8.5 Fr pigtail drain was advanced over the wire.  Thirty  cc of fluid was aspirated and sent for  microbiology.  The fluid was purulent.     Repeat CT showed interval decrease in size of the collection.  The pigtail was locked and the catheter was secured to the skin with suture and adhesive bandage.  The  catheter was placed to bulb suction     CT dose reduction techniques were utilized during this examination. Dose information is transmitted to the ACR (American College of Radiology) NRDR (National Radiology Data Registry) which includes the Dose Index Registry.     The patient tolerated the procedure well without immediate complication.  Routine post procedure instructions were communicated to the patient and nursing staff and documented in the chart.       ESTIMATED BLOOD LOSS: Less than 5cc.     Impression:    CONCLUSION: Successful drain placement into right hepatic lobe collection     LOCATION:  [facility]     Dictated by (CST): Anival Espino MD on 4/10/2024 at 12:43 PM      Finalized by (CST): Anival Espino MD on 4/10/2024 at 12:44 PM        PROCEDURE:  CT ABDOMEN+PELVIS (CONTRAST ONLY) (CPT=74177)     COMPARISON:  [facility] , CT, CT ABDOMEN+PELVIS(CPT=74176), 4/09/2024, 9:14 AM.     INDICATIONS:  Liver abcess     TECHNIQUE:  CT scanning was performed from the dome of the diaphragm to the pubic symphysis with non-ionic intravenous contrast material. Post contrast coronal MPR imaging was performed.  Dose reduction techniques were used. Dose information is  transmitted to the ACR (American College of Radiology) NRDR (National Radiology Data Registry) which includes the Dose Index Registry.     PATIENT STATED HISTORY:(As transcribed by Technologist)  Pt with elevatd LFt and fever.      CONTRAST USED:  65cc of Isovue 370     FINDINGS:    LIVER:  Multiloculated collection extending between segments 8, 4A along with 4B and 5 measures 9.9 x 7.0 cm in the axial plane and approximately 7.8 cm cranio caudally.  BILIARY:  No visible dilatation or calcification.    PANCREAS:  No lesion, fluid collection, ductal dilatation, or atrophy.    SPLEEN:  No enlargement or focal lesion.    KIDNEYS:  No mass, obstruction, or calcification.    ADRENALS:  No mass or enlargement.    AORTA/VASCULAR:  No aneurysm or dissection.     RETROPERITONEUM:  No mass or adenopathy.    BOWEL/MESENTERY:  The appendix is normal.  ABDOMINAL WALL:  No mass or hernia.    URINARY BLADDER:  No visible focal wall thickening, lesion, or calculus.    PELVIC NODES:  No adenopathy.    PELVIC ORGANS:  No visible mass.  Pelvic organs appropriate for patient age.    BONES:  Nonspecific sclerotic foci in the left iliac wing are likely bone islands.  LUNG BASES:  No visible pulmonary or pleural disease.    OTHER:  Negative.       Impression:    CONCLUSION:       Multiloculated collection extending to the right left hepatic lobes is again identified.  This is suspicious for a possible infectious collection.  Atypical cystic lesion or malignancy cannot be excluded.     LOCATION:  [facility]     Dictated by (CST): Bello Damian MD on 4/09/2024 at 5:01 PM      Finalized by (CST): Bello Damian MD on 4/09/2024 at 5:07 PM        ADDENDUM:      The findings were communicated with Dr. Alarcon at 10:37 a.m..  There is   appropriate read back.  Further evaluation with contrast enhanced CT   versus ultrasound was recommended.    Dictated by (CST): Anival Espino MD on 4/09/2024 at 10:41 AM       Finalized by (CST): Anival Espino MD on 4/09/2024 at 10:42 AM                   Signed: 04/09/24 1042 by Kenzie Florian MD  Narrative:    PROCEDURE:  CT ABDOMEN+PELVIS (CPT=74176)     COMPARISON:  None.     INDICATIONS:  elevated lft fever     TECHNIQUE:  Unenhanced multislice CT scanning was performed from the dome of the diaphragm to the pubic symphysis.  Dose reduction techniques were used. Dose information is transmitted to the ACR (American College of Radiology) NRDR (National Radiology  Data Registry) which includes the Dose Index Registry.     PATIENT STATED HISTORY: (As transcribed by Technologist)   Elevated lft fever.         FINDINGS:  Evaluation is limited without IV contrast.  LIVER:  There is a multi-septated/loculated fluid density structure in the right hepatic  lobe which measures grossly 7.7 x 6.7 x 7.6 centimeters.  BILIARY:  No visible dilatation or calcification.    PANCREAS:  No lesion, fluid collection, ductal dilatation, or atrophy.    SPLEEN:  No enlargement or focal lesion.    KIDNEYS:  No mass, obstruction, or calcification.    ADRENALS:  No mass or enlargement.    AORTA/VASCULAR:    Unremarkable as seen on non-contrast imaging.  RETROPERITONEUM:  No mass or adenopathy.    BOWEL/MESENTERY:  No dilated bowel or wall thickening.  Normal appendix.  ABDOMINAL WALL:  No mass or hernia.    URINARY BLADDER:  No visible focal wall thickening, lesion, or calculus.    PELVIC NODES:  No adenopathy.    PELVIC ORGANS:  Hysterectomy  BONES:  No bony lesion or fracture.    LUNG BASES:  There is a 5 mm right middle lobe pulmonary nodule (series 3, image 8).  There are additional smaller pulmonary nodules present, for example a 3 mm nodule in the left lower lobe (series 3, image 5).  OTHER:  Negative.       Impression:    CONCLUSION:    1. 7.7 cm multiloculated/septated fluid density structure in the right hepatic lobe, given history findings are suspicious for hepatic abscess.    2. Pulmonary nodules as described, measuring up to 5 millimeters. If patient is at low risk, no further workup is needed. If patient is at high risk, follow up CT chest in one year is recommended.     Findings were communicated with the patient's nurse, Saranya, at 10:15 a.m..  There was appropriate read back.     LOCATION:  [facility]     Dictated by (CST): Anival Espino MD on 4/09/2024 at 10:13 AM      Finalized by (CST): Anival Espino MD on 4/09/2024 at 10:22 AM        ASSESSMENT:  Liver abscess, s/p IR drainage with drain placement 4/10- purulent output. Gram stain with GNR.   Fevers and leukocytosis, assume d/t above. No other obvious source of infection.  Elevated LFTs, improving  Thrombocytosis  Near syncopal episode prior to admission. Brain CT and MRI w/o acute process. EEG w/o seizure  activity. Orthostatics negative.   HTN, HLD  DM II, hgb A1C 8.9  Pulmonary nodules    PLAN:  - continue IV Zosyn  - follow blood culture- ngtd  - follow cultures from liver  - follow temps and wbc  - follow LFTs  - follow abd exam and monitor drain output  - follow drain output--> continue to have large amount of output at this time.   - will need IV abx at the time of dc; will plan to place PICC tomorrow if blood culture remains negative at 48hrs.     Discussed case with RN, SW, Dr. Baum, patient and patient's family at the bedside.    LIV Goyal   [facility]  [phone]

## 2024-04-11 NOTE — PROGRESS NOTES
[facility]   part of [facility]     Hospitalist Progress Note     Essence Acuña Patient Status:  Inpatient    1950 MRN [id number]   Location [facility] 3NE-A Attending Viktoriya Tsang MD   Hosp Day # 5 PCP No primary care provider on file.     Chief Complaint: syncope    Subjective:     Feeling much better, output from drain is less purulent this morning compared to when it was first placed. No fevers last night    Objective:    Review of Systems:   A comprehensive review of systems was completed; pertinent positive and negatives stated in subjective.    Vital signs:  Temp:  [97 °F (36.1 °C)-98 °F (36.7 °C)] 98 °F (36.7 °C)  Pulse:  [54-72] 70  Resp:  [15-24] 20  BP: (117-148)/(50-84) 140/58  SpO2:  [93 %-100 %] 98 %    Physical Exam:    General: No acute distress  Respiratory: No wheezes, no rhonchi  Cardiovascular: S1, S2, regular rate and rhythm  Abdomen: Soft, Non-tender, non-distended, positive bowel sounds  Neuro: No new focal deficits.   Extremities: No edema      Diagnostic Data:    Labs:  Recent Labs   Lab 24  0841 24  1001 24  1014 04/10/24  0701 24  0606   WBC 19.7* 16.1* 17.6* 19.0* 11.4*   HGB 11.8* 12.1 11.7* 10.2* 10.5*   MCV 80.6 80.1 79.6* 79.5* 77.6*   .0* 496.0* 550.0* 603.0* 624.0*   INR  --   --   --  1.12  --        Recent Labs   Lab 24  1014 04/10/24  0701 04/10/24  1846 24  0608   * 223*  --  227*   BUN 24* 20  --  18   CREATSERUM 1.07* 0.99  --  0.91   CA 9.1 8.9  --  8.6   ALB 2.4* 2.0*  --  1.9*   * 135*  --  136   K 4.2 3.5 5.0 4.7    106  --  107   CO2 23.0 23.0  --  21.0   ALKPHO 275* 256*  --  231*   AST 60* 77*  --  53*   * 120*  --  103*   BILT 0.9 0.9  --  0.6   TP 7.1 6.4  --  6.2*       CrCl cannot be calculated (Unknown ideal weight.).    Recent Labs   Lab 24  0841   TROPHS 4       Recent Labs   Lab 04/10/24  0701   PTP 14.4   INR 1.12        Microbiology    Hospital Encounter on 24    1. Anaerobic Culture     Status: None (Preliminary result)    Collection Time: 04/10/24 12:15 PM    Specimen: Liver; Abscess   Result Value Ref Range    Anaerobic Culture Pending N/A   2. Blood Culture     Status: None (Preliminary result)    Collection Time: 04/09/24  9:58 AM    Specimen: Blood,peripheral   Result Value Ref Range    Blood Culture Result No Growth 1 Day N/A         Imaging: Reviewed in Epic.    Medications:    piperacillin-tazobactam  3.375 g Intravenous Q8H    insulin degludec  5 Units Subcutaneous Nightly    insulin aspart  1-68 Units Subcutaneous TID CC    insulin aspart  1-10 Units Subcutaneous TID AC and HS    amLODIPine Besylate-Valsartan  1 tablet Oral Daily    atenolol  25 mg Oral BID    atorvastatin  40 mg Oral QPM    pantoprazole  40 mg Oral Daily       Assessment & Plan:      #Hepatic abscess  #fevers  #Transaminitis  #Leukocytosis   - CTAP  reviewed > 7.7 multiloculated/septated fluid density structure in R hepatic lobe suspicious for abscess  - empiric zoyn  - IR guided drainage with cultures 4/10  - culture currently with GNR, final results pending  - LFT's downtrending  - blood cultures NGTD  - will need PICC placed prior to discharge  - ID following    #Thrombocytosis, suspect reactive d/t above  - follow CBC    #Pulmonary nodules, incidental finding on CTAP  - CT chest reviewed > pt advised outpt f/u and repeat CT chest in 3-6 months    #Syncope, suspect d/t above infection  - MRI brain and EEG without concerning findings or pathology  - IVF  - neurology signed off    #HTN  - PTA amlodipine-valsartan, atenolol    #HLD  - statin    #GERD  - PPI    #DM2 with hyperglycemia  - hyperglycemia protocol  - increase degludec to 7u at bedtime; adjust pending response  - ICF 1:20, carb ratio 1:10      Shreya Mcmullen DO      Supplementary Documentation:     Quality:  DVT Mechanical Prophylaxis:   SCDs, Early ambuation  DVT Pharmacologic Prophylaxis   Medication   None                 Code Status: Not on file  John: No urinary catheter in place  John Duration (in days):   Central line:    HUGO:     Discharge is dependent on: progress  At this point Ms. Acuña is expected to be discharge to: tbd    The 21st Century Cures Act makes medical notes like these available to patients in the interest of transparency. Please be advised this is a medical document. Medical documents are intended to carry relevant information, facts as evident, and the clinical opinion of the practitioner. The medical note is intended as peer to peer communication and may appear blunt or direct. It is written in medical language and may contain abbreviations or verbiage that are unfamiliar.

## 2024-04-12 ENCOUNTER — ORDER TRANSCRIPTION (OUTPATIENT)
Dept: INFECTIOUS DISEASE | Facility: CLINIC | Age: 74
End: 2024-04-12

## 2024-04-12 VITALS
TEMPERATURE: 98 F | RESPIRATION RATE: 18 BRPM | DIASTOLIC BLOOD PRESSURE: 62 MMHG | OXYGEN SATURATION: 99 % | HEART RATE: 70 BPM | BODY MASS INDEX: 22 KG/M2 | SYSTOLIC BLOOD PRESSURE: 136 MMHG | WEIGHT: 127 LBS

## 2024-04-12 DIAGNOSIS — K75.0 LIVER ABSCESS (HCC): Primary | ICD-10-CM

## 2024-04-12 LAB
GLUCOSE BLD-MCNC: 186 MG/DL (ref 70–99)
GLUCOSE BLD-MCNC: 205 MG/DL (ref 70–99)

## 2024-04-12 PROCEDURE — 99239 HOSP IP/OBS DSCHRG MGMT >30: CPT | Performed by: INTERNAL MEDICINE

## 2024-04-12 PROCEDURE — 02HV33Z INSERTION OF INFUSION DEVICE INTO SUPERIOR VENA CAVA, PERCUTANEOUS APPROACH: ICD-10-PCS | Performed by: INTERNAL MEDICINE

## 2024-04-12 RX ORDER — LIDOCAINE HYDROCHLORIDE 10 MG/ML
5 INJECTION, SOLUTION EPIDURAL; INFILTRATION; INTRACAUDAL; PERINEURAL
Status: COMPLETED | OUTPATIENT
Start: 2024-04-12 | End: 2024-04-12

## 2024-04-12 NOTE — PLAN OF CARE
Assumed care at 0730.  A&O 4.  Room air.  Fluids- Scheduled Zosyn Q 8. See MAR for more details.  Carb control diet.   Accuchecks. See MAR for insulin administration details. Education provided about waiting to eat until after accucheck is taken both verbally and written on board in the room.   Tele- NSR/SB.  No VTE.  Ortho BP Q shift. See managed orders and notes for more details.  Neuro assessments Q 4. See managed orders and flowsheets for more details.  LEOPOLDO drain to RUQ with an order to flush Q8. See managed orders for more details.   Anaerobic and blood cultures pending. See managed orders and results for more details.   Independent after set up to the bathroom.      Pt oriented to the room, safety prec initiated, bed is in the lowest position and call light within reach. Pt and family at bedside updated on the plan of care and awaiting discharge after PICC placement. All needs met at this time.     1101: Zosyn discontinued and Invanz prescribed. See MAR and managed orders for more details.   1111: Order placed to place a PICC. See managed orders for more details.   1113: Neuro assessments dc. See managed orders for more details.   1204: PICC in place. R arm prec. See managed orders, results and LDA for more details.     Problem: Diabetes/Glucose Control  Goal: Glucose maintained within prescribed range  Description: INTERVENTIONS:  - Monitor Blood Glucose as ordered  - Assess for signs and symptoms of hyperglycemia and hypoglycemia  - Administer ordered medications to maintain glucose within target range  - Assess barriers to adequate nutritional intake and initiate nutrition consult as needed  - Instruct patient on self management of diabetes  Outcome: Progressing     Problem: Patient/Family Goals  Goal: Patient/Family Long Term Goal  Description: Patient's Long Term Goal: discharge     Interventions:  - complete MRI   - neuro c/s  - ortho BP q shift  - See additional Care Plan goals for specific  interventions  Outcome: Progressing  Goal: Patient/Family Short Term Goal  Description: Patient's Short Term Goal: to prevent further syncopal episodes     Interventions:   - neuro & cards consults  - See additional Care Plan goals for specific interventions  Outcome: Progressing

## 2024-04-12 NOTE — PROGRESS NOTES
INFECTIOUS DISEASE  PROGRESS NOTE            [facility]    Essence Acuña Patient Status:  Inpatient    1950 MRN [id number]   [facility] 3NE-A Attending Shreya Mcmullen,    Hosp Day # 6 PCP No primary care provider on file.     Antibiotics: zosyn-dc  Ertapenem    Subjective:  : comfortable    Objective:  Temp:  [96.9 °F (36.1 °C)-98.3 °F (36.8 °C)] 97.9 °F (36.6 °C)  Pulse:  [56-74] 56  Resp:  [16] 16  BP: (113-158)/(54-74) 154/55  SpO2:  [96 %-100 %] 99 %    General: awake alert  Vital signs:Temp:  [96.9 °F (36.1 °C)-98.3 °F (36.8 °C)] 97.9 °F (36.6 °C)  Pulse:  [56-74] 56  Resp:  [16] 16  BP: (113-158)/(54-74) 154/55  SpO2:  [96 %-100 %] 99 %  HEENT: Moist mucous membranes. Extraocular muscles are intact.  Neck: supple no masses  Respiratory: Non labored, symmetric excursion, normal respirations  Cardiovascular: no irregularities in rhythm  Abdomen: Soft, nontender, nondistended. Drain in place  Musculoskeletal: joints: no swelling   Integument: No lesions. No erythema. No open wounds.  Labs:     COVID-19 Lab Results    COVID-19  Lab Results   Component Value Date    COVID19 Not Detected 2024    COVID19 Not Detected 2024       Pro-Calcitonin  No results for input(s): \"PCT\" in the last 168 hours.    Cardiac  No results for input(s): \"TROP\", \"PBNP\" in the last 168 hours.    Creatinine Kinase  No results for input(s): \"CK\" in the last 168 hours.    Inflammatory Markers  No results for input(s): \"CRP\", \"EDGARDO\", \"LDH\", \"DDIMER\" in the last 168 hours.    Recent Labs   Lab 24  0606   RBC 4.01   HGB 10.5*   HCT 31.1*   MCV 77.6*   MCH 26.2   MCHC 33.8   RDW 14.8   NEPRELIM 10.02*   WBC 11.4*   .0*         Recent Labs   Lab 24  1014 04/10/24  0701 04/10/24  1846 24  0608   * 223*  --  227*   BUN 24* 20  --  18   CREATSERUM 1.07* 0.99  --  0.91   CA 9.1 8.9  --  8.6   ALB 2.4* 2.0*  --  1.9*   * 135*  --  136   K 4.2 3.5 5.0 4.7     106  --  107   CO2 23.0 23.0  --  21.0   ALKPHO 275* 256*  --  231*   AST 60* 77*  --  53*   * 120*  --  103*   BILT 0.9 0.9  --  0.6   TP 7.1 6.4  --  6.2*       No results found for: \"VANCT\"  Microbiology    Hospital Encounter on 04/06/24   1. Anaerobic Culture     Status: None (Preliminary result)    Collection Time: 04/10/24 12:15 PM    Specimen: Liver; Abscess   Result Value Ref Range    Anaerobic Culture Pending N/A   2. Aerobic Bacterial Culture     Status: Abnormal    Collection Time: 04/10/24 12:15 PM    Specimen: Liver; Abscess   Result Value Ref Range    Aerobic Culture Result 4+ growth Klebsiella pneumoniae (A) N/A    Aerobic Smear 4+ WBCs seen N/A    Aerobic Smear 1+ Gram Negative Rods N/A       Susceptibility    Klebsiella pneumoniae -  (no method available)     Ampicillin  Resistant      Ampicillin + Sulbactam 4 Sensitive      Cefazolin <=4 Sensitive      Ciprofloxacin <=0.25 Sensitive      Gentamicin <=1 Sensitive      Meropenem <=0.25 Sensitive      Levofloxacin <=0.12 Sensitive      Piperacillin + Tazobactam <=4 Sensitive      Trimethoprim/Sulfa <=20 Sensitive    3. Blood Culture     Status: None (Preliminary result)    Collection Time: 04/09/24  9:58 AM    Specimen: Blood,peripheral   Result Value Ref Range    Blood Culture Result No Growth 2 Days N/A         Problem list reviewed:  Patient Active Problem List   Diagnosis    Syncope and collapse    Contusion of rib on right side, initial encounter    Leukocytosis, unspecified type    Hypomagnesemia    Hepatic abscess (HCC)             ASSESSMENT/PLAN:  1. Liver abscess sp IR drain  Culture Klebsiella, anaerobes pending  -PICC line today  -Invanz 1g daily for dishcarge  -CT drain check as outpatient when output < 10 ML daily for 3 days  -FU GI for colon screening      Rio Baum MD, MD  [facility]  [phone]

## 2024-04-12 NOTE — CM/SW NOTE
04/12/24 1200   Discharge disposition   Expected discharge disposition Home-Health   Post Acute Care Provider Residential   DME/Infusion Providers [facility]     Pt cleared for dc today.  Copay for iv abs is $79.74.  Pt aware of costs for IV abs.  HC to follow for RN. Plan is for start of care tomorrow.    Yvonne Segundo LCSW  /Discharge Planner

## 2024-04-12 NOTE — PROGRESS NOTES
NURSING DISCHARGE NOTE    Discharged Home via Wheelchair.  Accompanied by Family member, RN, and Spouse  Belongings Taken by patient/family.  Peripheral IV discontinued, R PICC in place and RUQ drain. AVS reviewed and sent home with the patient along with printed prescription for Ertapenem. All questions answered at this time.

## 2024-04-12 NOTE — PROGRESS NOTES
Assumed care at 1930  A&ox4  RA  NSR/SB on tele  Carb controlled diet  QID acucheck  Denies pain  SB assist  IV zosyn q8  Q8 flushes to tyesha drain  Orthos qs  PICC line placement today  Following blood cultures  Safety precautions in place  All  needs met at this time  Continue current POC

## 2024-04-12 NOTE — CM/SW NOTE
04/12/24 1100   Choice of Post-Acute Provider   Informed patient of right to choose their preferred provider Yes   List of appropriate post-acute services provided to patient/family with quality data Yes   Patient/family choice [facility]   Information given to Patient   [facility] financial disclosure given Yes     Pt will be cleared to dc today. Orders for IV abs sent to [facility]. PICC to be placed today and then plan on dc.  [facility] chosen for C.    Yvonne Segundo LCSW  /Discharge Planner

## 2024-04-12 NOTE — HOME CARE LIAISON
Received referral via [facility] for Home Health services. Spoke w/ patient and daughter who is agreeable with [facility]. Contact information placed on AVS.

## 2024-04-12 NOTE — PROGRESS NOTES
04/12/24 1224 04/12/24 1226 04/12/24 1229   Vitals   /57 139/61 136/62   MAP (mmHg) 83 84 84   BP Location Left arm Left arm Left arm   BP Method Automatic Automatic Automatic   Patient Position Lying Sitting Standing     Orthostatic BP per Q shift order.

## 2024-04-12 NOTE — DISCHARGE SUMMARY
[facility] HOSPITALIST  DISCHARGE SUMMARY     Essence Acuña Patient Status:  Inpatient    1950 MRN [id number]   Location [facility] 3NE-A Attending Shreya Mcmullen, DO   Hosp Day # 6 PCP No primary care provider on file.     Date of Admission: 2024  Date of Discharge:   24  Discharge Disposition: Home or Self Care    Discharge Diagnosis:  #Hepatic abscess  #fevers  #Transaminitis  #Leukocytosis   #Thrombocytosis, suspect reactive d/t above  #Pulmonary nodules, incidental finding on CTAP  #Syncope, suspect d/t above infection  #HTN  #HLD  #GERD  #DM2 with hyperglycemia    History of Present Illness: (per Dr. Tsang), Essence Acuña is a 73 year old female with history of diabetes type 2 hypertension hyperlipidemia presents emergency room today with an episode of near syncope.  Patient states that she stood up and felt lightheaded.   states she did not fall to the ground but patient thinks her son found her lying on the floor.  She is complaining of discomfort in the right lateral rib 5 6 out of 10 worse with palpation.  She denies any headache dizziness blurred vision chest pain cough shortness of breath.  NIH is 0.  Patient describes a similar episode in the past with blurry vision slight confusion and not remembering much about the details of the episode.  She denies history of seizures.     Brief Synopsis: admitted after syncopal episode. Neurology and cardiology services consulted and her cardiac/neurological w/u were unremarkable. She developed fevers during her admission and subsequent CTAP showed findings concerning for hepatic abscess. She was started on empiric IV abx, ID consulted, and surgical drain was placed by IR service. Cultures grew Klebsiella. Her CT chest showed non-specific pulmonary nodules which she was advised to discuss with her PCP and have repeat imaging in 3-6 months. Her clinical condition improved, blood cultures remained NG during her admission. PICC was placed  and she was discharged to home in good clinical condition with surgical drain in place on long-term IV abx with recommendation to f/u with PCP and ID in outpt setting.     Lace+ Score: 38  59-90 High Risk  29-58 Medium Risk  0-28   Low Risk  Patient was referred to the [facility].    TCM Follow-Up Recommendation:  LACE 29-58: Moderate Risk of readmission after discharge from the hospital.      Procedures during hospitalization:   IR drain placement    Incidental or significant findings and recommendations (brief descriptions):  As above    Lab/Test results pending at Discharge:   Final blood cultures > NGTD    Consultants:  Neurology  Cardiology  ID  IR    Discharge Medication List:     Discharge Medications        START taking these medications        Instructions Prescription details   ertapenem 1 g 1 g in sodium chloride 0.9% 100 mL  Start taking on: April 13, 2024  Notes to patient: Home health to come between 9-10 AM      Inject 1 g into the vein in the morning. CBC, CMP, CRP weekly.   Stop taking on: May 15, 2024  Quantity: 7 Bag  Refills: 4            CONTINUE taking these medications        Instructions Prescription details   amLODIPine Besylate-Valsartan  MG Tabs  Commonly known as: EXFORGE      Take 1 tablet by mouth daily. Once in the am   Refills: 0     atenolol 25 MG Tabs  Commonly known as: Tenormin      Take 1 tablet (25 mg total) by mouth 2 (two) times daily.   Refills: 0     Farxiga 10 MG Tabs  Generic drug: dapagliflozin      Take 1 tablet (10 mg total) by mouth daily.   Refills: 0     metFORMIN HCl 1000 MG Tabs  Commonly known as: GLUCOPHAGE      Take 1 tablet (1,000 mg total) by mouth 2 (two) times daily.   Refills: 0     pantoprazole 40 MG Tbec  Commonly known as: Protonix      Take 1 tablet (40 mg total) by mouth every morning before breakfast.   Refills: 0     rosuvastatin 20 MG Tabs  Commonly known as: Crestor      Take 1 tablet (20 mg total) by mouth nightly.    Refills: 0               Where to Get Your Medications        Please  your prescriptions at the location directed by your doctor or nurse    Bring a paper prescription for each of these medications  ertapenem 1 g 1 g in sodium chloride 0.9% 100 mL         [place]    Follow-up appointment:   LOGAN MENG  [facility] [address] [facility] [id number] [phone]  Follow up  office will call to schedule MCT heart monitor and  follow up    Sierra Green DO  [address] [facility] [address]  [facility] [id number] [phone]    Schedule an appointment as soon as possible for a visit  As needed    Rio Baum MD  [address]  [facility] [address]  [phone]    Schedule an appointment as soon as possible for a visit on 2024      [facility]  [address] [facility] [address]  [facility] [address] [phone]  Follow up  2-4 days after discharge    Appointments for Next 30 Days 2024 - 2024      None            Vital signs:  Temp:  [96.9 °F (36.1 °C)-98.4 °F (36.9 °C)] 98.4 °F (36.9 °C)  Pulse:  [56-72] 70  Resp:  [16-18] 18  BP: (136-158)/(55-66) 136/62  SpO2:  [96 %-100 %] 99 %    Physical Exam:    General: No acute distress   Lungs: clear to auscultation  Cardiovascular: S1, S2  Abdomen: Soft    -----------------------------------------------------------------------------------------------  PATIENT DISCHARGE INSTRUCTIONS: See electronic chart    Shreya Mcmullen DO    Total time spent on discharge plannin minutes     The  Cures Act makes medical notes like these available to patients in the interest of transparency. Please be advised this is a medical document. Medical documents are intended to carry relevant information, facts as evident, and the clinical opinion of the practitioner. The medical note is intended as peer to peer communication and may appear blunt or direct. It is written in medical language and may contain  abbreviations or verbiage that are unfamiliar.

## 2024-04-15 ENCOUNTER — PATIENT OUTREACH (OUTPATIENT)
Dept: CASE MANAGEMENT | Age: 74
End: 2024-04-15

## 2024-04-15 ENCOUNTER — LAB REQUISITION (OUTPATIENT)
Dept: LAB | Age: 74
End: 2024-04-15
Payer: MEDICARE

## 2024-04-15 DIAGNOSIS — K75.0 ABSCESS OF LIVER: ICD-10-CM

## 2024-04-15 LAB
ALBUMIN SERPL-MCNC: 3.8 G/DL (ref 3.2–4.8)
ALBUMIN/GLOB SERPL: 1.2 {RATIO} (ref 1–2)
ALP LIVER SERPL-CCNC: 243 U/L
ALT SERPL-CCNC: 59 U/L
ANION GAP SERPL CALC-SCNC: 10 MMOL/L (ref 0–18)
AST SERPL-CCNC: 31 U/L (ref ?–34)
BASOPHILS # BLD AUTO: 0.06 X10(3) UL (ref 0–0.2)
BASOPHILS NFR BLD AUTO: 0.5 %
BILIRUB SERPL-MCNC: 0.3 MG/DL (ref 0.2–1.1)
BUN BLD-MCNC: 16 MG/DL (ref 9–23)
BUN/CREAT SERPL: 19.5 (ref 10–20)
CALCIUM BLD-MCNC: 9.3 MG/DL (ref 8.7–10.4)
CHLORIDE SERPL-SCNC: 107 MMOL/L (ref 98–112)
CO2 SERPL-SCNC: 22 MMOL/L (ref 21–32)
CREAT BLD-MCNC: 0.82 MG/DL
CRP SERPL-MCNC: 4.8 MG/DL (ref ?–1)
DEPRECATED RDW RBC AUTO: 45.5 FL (ref 35.1–46.3)
EGFRCR SERPLBLD CKD-EPI 2021: 76 ML/MIN/1.73M2 (ref 60–?)
EOSINOPHIL # BLD AUTO: 0.08 X10(3) UL (ref 0–0.7)
EOSINOPHIL NFR BLD AUTO: 0.7 %
ERYTHROCYTE [DISTWIDTH] IN BLOOD BY AUTOMATED COUNT: 15.4 % (ref 11–15)
GLOBULIN PLAS-MCNC: 3.2 G/DL (ref 2.8–4.4)
GLUCOSE BLD-MCNC: 130 MG/DL (ref 70–99)
HCT VFR BLD AUTO: 35.1 %
HGB BLD-MCNC: 11.4 G/DL
IMM GRANULOCYTES # BLD AUTO: 0.07 X10(3) UL (ref 0–1)
IMM GRANULOCYTES NFR BLD: 0.6 %
LYMPHOCYTES # BLD AUTO: 1.09 X10(3) UL (ref 1–4)
LYMPHOCYTES NFR BLD AUTO: 9.6 %
MCH RBC QN AUTO: 26.6 PG (ref 26–34)
MCHC RBC AUTO-ENTMCNC: 32.5 G/DL (ref 31–37)
MCV RBC AUTO: 82 FL
MONOCYTES # BLD AUTO: 0.49 X10(3) UL (ref 0.1–1)
MONOCYTES NFR BLD AUTO: 4.3 %
NEUTROPHILS # BLD AUTO: 9.55 X10 (3) UL (ref 1.5–7.7)
NEUTROPHILS # BLD AUTO: 9.55 X10(3) UL (ref 1.5–7.7)
NEUTROPHILS NFR BLD AUTO: 84.3 %
OSMOLALITY SERPL CALC.SUM OF ELEC: 291 MOSM/KG (ref 275–295)
PLATELET # BLD AUTO: 787 10(3)UL (ref 150–450)
POTASSIUM SERPL-SCNC: 4.4 MMOL/L (ref 3.5–5.1)
PROT SERPL-MCNC: 7 G/DL (ref 5.7–8.2)
RBC # BLD AUTO: 4.28 X10(6)UL
SODIUM SERPL-SCNC: 139 MMOL/L (ref 136–145)
WBC # BLD AUTO: 11.3 X10(3) UL (ref 4–11)

## 2024-04-15 PROCEDURE — 86140 C-REACTIVE PROTEIN: CPT | Performed by: INTERNAL MEDICINE

## 2024-04-15 PROCEDURE — 80053 COMPREHEN METABOLIC PANEL: CPT | Performed by: INTERNAL MEDICINE

## 2024-04-15 PROCEDURE — 85025 COMPLETE CBC W/AUTO DIFF WBC: CPT | Performed by: INTERNAL MEDICINE

## 2024-04-15 NOTE — PROGRESS NOTES
Initial Post Discharge Follow Up   Discharge Date: 4/12/24  Contact Date: 4/15/2024    Consent Verification:  Assessment Completed With: Patient  HIPAA Verified?  Yes    Discharge Dx:   #Hepatic abscess  #fevers  #Transaminitis  #Leukocytosis   #Thrombocytosis, suspect reactive d/t above  #Pulmonary nodules, incidental finding on CTAP  #Syncope, suspect d/t above infection  #HTN  #HLD  #GERD  #DM2 with hyperglycemia    General:   How have you been since your discharge from the hospital? NC spoke with pt states she is receiving her antibiotic at this time. Pt is feeling okay. She denies any fevers, chills, nausea, vomiting, shortness of breath, chest pain or any others. Pt states has upcoming appointment with outside PCP on 4/28/24 with Dr. Franklin. Pt declined TCC. She denies having any further questions. NCM closing encounter.

## 2024-04-16 NOTE — PAYOR COMM NOTE
--------------  DISCHARGE REVIEW    Payor: [employer]  Subscriber #:  [id number]  Authorization Number: [id number]    Admit date: 24  Admit time:   1:23 PM  Discharge Date: 2024  4:21 PM     Admitting Physician: Viktoriya Tsang MD  Attending Physician:  No att. providers found  Primary Care Physician: No primary care provider on file.          Discharge Summary Notes        Discharge Summary signed by Shreya Mcmullen DO at 2024  4:29 PM       Author: Shreya Mcmullen DO Specialty: HOSPITALIST Author Type: Physician    Filed: 2024  4:29 PM Date of Service: 2024  4:17 PM Status: Signed    : Shreya Mcmullen DO (Physician)           [facility]     Essence Acuña Patient Status:  Inpatient    1950 MRN [id number]   Location [facility] 3NE-A Attending Shreya Mcmullen DO   Hosp Day # 6 PCP No primary care provider on file.     Date of Admission: 2024  Date of Discharge:   24  Discharge Disposition: Home or Self Care    Discharge Diagnosis:  #Hepatic abscess  #fevers  #Transaminitis  #Leukocytosis   #Thrombocytosis, suspect reactive d/t above  #Pulmonary nodules, incidental finding on CTAP  #Syncope, suspect d/t above infection  #HTN  #HLD  #GERD  #DM2 with hyperglycemia    History of Present Illness: (per Dr. Tsang), Essence Acuña is a 73 year old female with history of diabetes type 2 hypertension hyperlipidemia presents emergency room today with an episode of near syncope.  Patient states that she stood up and felt lightheaded.   states she did not fall to the ground but patient thinks her son found her lying on the floor.  She is complaining of discomfort in the right lateral rib 5 6 out of 10 worse with palpation.  She denies any headache dizziness blurred vision chest pain cough shortness of breath.  NIH is 0.  Patient describes a similar episode in the past with blurry vision slight confusion and not  remembering much about the details of the episode.  She denies history of seizures.     Brief Synopsis: admitted after syncopal episode. Neurology and cardiology services consulted and her cardiac/neurological w/u were unremarkable. She developed fevers during her admission and subsequent CTAP showed findings concerning for hepatic abscess. She was started on empiric IV abx, ID consulted, and surgical drain was placed by IR service. Cultures grew Klebsiella. Her CT chest showed non-specific pulmonary nodules which she was advised to discuss with her PCP and have repeat imaging in 3-6 months. Her clinical condition improved, blood cultures remained NG during her admission. PICC was placed and she was discharged to home in good clinical condition with surgical drain in place on long-term IV abx with recommendation to f/u with PCP and ID in outpt setting.     Lace+ Score: 38  59-90 High Risk  29-58 Medium Risk  0-28   Low Risk  Patient was referred to the [facility].    TCM Follow-Up Recommendation:  LACE 29-58: Moderate Risk of readmission after discharge from the hospital.      Procedures during hospitalization:   IR drain placement    Incidental or significant findings and recommendations (brief descriptions):  As above    Lab/Test results pending at Discharge:   Final blood cultures > NGTD    Consultants:  Neurology  Cardiology  ID  IR    Discharge Medication List:     Discharge Medications        START taking these medications        Instructions Prescription details   ertapenem 1 g 1 g in sodium chloride 0.9% 100 mL  Start taking on: April 13, 2024  Notes to patient: Home health to come between 9-10 AM      Inject 1 g into the vein in the morning. CBC, CMP, CRP weekly.   Stop taking on: May 15, 2024  Quantity: 7 Bag  Refills: 4            CONTINUE taking these medications        Instructions Prescription details   amLODIPine Besylate-Valsartan  MG Tabs  Commonly known as: EXFORGE       Take 1 tablet by mouth daily. Once in the am   Refills: 0     atenolol 25 MG Tabs  Commonly known as: Tenormin      Take 1 tablet (25 mg total) by mouth 2 (two) times daily.   Refills: 0     Farxiga 10 MG Tabs  Generic drug: dapagliflozin      Take 1 tablet (10 mg total) by mouth daily.   Refills: 0     metFORMIN HCl 1000 MG Tabs  Commonly known as: GLUCOPHAGE      Take 1 tablet (1,000 mg total) by mouth 2 (two) times daily.   Refills: 0     pantoprazole 40 MG Tbec  Commonly known as: Protonix      Take 1 tablet (40 mg total) by mouth every morning before breakfast.   Refills: 0     rosuvastatin 20 MG Tabs  Commonly known as: Crestor      Take 1 tablet (20 mg total) by mouth nightly.   Refills: 0               Where to Get Your Medications        Please  your prescriptions at the location directed by your doctor or nurse    Bring a paper prescription for each of these medications  ertapenem 1 g 1 g in sodium chloride 0.9% 100 mL         [place]    Follow-up appointment:   [address] [address] [phone]  Follow up  office will call to schedule MCT heart monitor and  follow up    Sierra Green DO  [address] [facility] [address]  [facility] [address]  [phone]    Schedule an appointment as soon as possible for a visit  As needed    Rio Baum MD  [address] [facility] [address]  [facility] [address]  [phone]    Schedule an appointment as soon as possible for a visit on 4/24/2024      [facility]  [address] [facility] [address]  [facility] [address] [phone]  Follow up  2-4 days after discharge    Appointments for Next 30 Days 4/12/2024 - 5/12/2024      None            Vital signs:  Temp:  [96.9 °F (36.1 °C)-98.4 °F (36.9 °C)] 98.4 °F (36.9 °C)  Pulse:  [56-72] 70  Resp:  [16-18] 18  BP: (136-158)/(55-66) 136/62  SpO2:  [96 %-100 %] 99 %    Physical Exam:    General: No acute distress   Lungs: clear to  auscultation  Cardiovascular: S1, S2  Abdomen: Soft    -----------------------------------------------------------------------------------------------  PATIENT DISCHARGE INSTRUCTIONS: See electronic chart    Shreya Mcmullen DO    Total time spent on discharge plannin minutes     The  [place] Cures Act makes medical notes like these available to patients in the interest of transparency. Please be advised this is a medical document. Medical documents are intended to carry relevant information, facts as evident, and the clinical opinion of the practitioner. The medical note is intended as peer to peer communication and may appear blunt or direct. It is written in medical language and may contain abbreviations or verbiage that are unfamiliar.       Electronically signed by Shreya Mcmullen DO on 2024  4:29 PM         REVIEWER COMMENTS

## 2024-04-22 ENCOUNTER — LAB REQUISITION (OUTPATIENT)
Dept: LAB | Age: 74
End: 2024-04-22
Payer: MEDICARE

## 2024-04-22 DIAGNOSIS — K75.0 ABSCESS OF LIVER: ICD-10-CM

## 2024-04-22 LAB
ALBUMIN SERPL-MCNC: 3.6 G/DL (ref 3.2–4.8)
ALBUMIN/GLOB SERPL: 1.2 {RATIO} (ref 1–2)
ALP LIVER SERPL-CCNC: 133 U/L
ALT SERPL-CCNC: 26 U/L
ANION GAP SERPL CALC-SCNC: 10 MMOL/L (ref 0–18)
AST SERPL-CCNC: 21 U/L (ref ?–34)
BASOPHILS # BLD AUTO: 0.05 X10(3) UL (ref 0–0.2)
BASOPHILS NFR BLD AUTO: 0.7 %
BILIRUB SERPL-MCNC: 0.3 MG/DL (ref 0.2–1.1)
BUN BLD-MCNC: 29 MG/DL (ref 9–23)
BUN/CREAT SERPL: 33 (ref 10–20)
CALCIUM BLD-MCNC: 9.3 MG/DL (ref 8.7–10.4)
CHLORIDE SERPL-SCNC: 105 MMOL/L (ref 98–112)
CO2 SERPL-SCNC: 24 MMOL/L (ref 21–32)
CREAT BLD-MCNC: 0.88 MG/DL
CRP SERPL-MCNC: <0.4 MG/DL (ref ?–1)
DEPRECATED RDW RBC AUTO: 49 FL (ref 35.1–46.3)
EGFRCR SERPLBLD CKD-EPI 2021: 70 ML/MIN/1.73M2 (ref 60–?)
EOSINOPHIL # BLD AUTO: 0.15 X10(3) UL (ref 0–0.7)
EOSINOPHIL NFR BLD AUTO: 2.1 %
ERYTHROCYTE [DISTWIDTH] IN BLOOD BY AUTOMATED COUNT: 16.4 % (ref 11–15)
GLOBULIN PLAS-MCNC: 2.9 G/DL (ref 2.8–4.4)
GLUCOSE BLD-MCNC: 210 MG/DL (ref 70–99)
HCT VFR BLD AUTO: 35.1 %
HGB BLD-MCNC: 11.2 G/DL
IMM GRANULOCYTES # BLD AUTO: 0.03 X10(3) UL (ref 0–1)
IMM GRANULOCYTES NFR BLD: 0.4 %
LYMPHOCYTES # BLD AUTO: 1.08 X10(3) UL (ref 1–4)
LYMPHOCYTES NFR BLD AUTO: 15 %
MCH RBC QN AUTO: 26.9 PG (ref 26–34)
MCHC RBC AUTO-ENTMCNC: 31.9 G/DL (ref 31–37)
MCV RBC AUTO: 84.2 FL
MONOCYTES # BLD AUTO: 0.35 X10(3) UL (ref 0.1–1)
MONOCYTES NFR BLD AUTO: 4.9 %
NEUTROPHILS # BLD AUTO: 5.53 X10 (3) UL (ref 1.5–7.7)
NEUTROPHILS # BLD AUTO: 5.53 X10(3) UL (ref 1.5–7.7)
NEUTROPHILS NFR BLD AUTO: 76.9 %
OSMOLALITY SERPL CALC.SUM OF ELEC: 300 MOSM/KG (ref 275–295)
PLATELET # BLD AUTO: 674 10(3)UL (ref 150–450)
POTASSIUM SERPL-SCNC: 4.4 MMOL/L (ref 3.5–5.1)
PROT SERPL-MCNC: 6.5 G/DL (ref 5.7–8.2)
RBC # BLD AUTO: 4.17 X10(6)UL
SODIUM SERPL-SCNC: 139 MMOL/L (ref 136–145)
WBC # BLD AUTO: 7.2 X10(3) UL (ref 4–11)

## 2024-04-22 PROCEDURE — 85025 COMPLETE CBC W/AUTO DIFF WBC: CPT | Performed by: INTERNAL MEDICINE

## 2024-04-22 PROCEDURE — 80053 COMPREHEN METABOLIC PANEL: CPT | Performed by: INTERNAL MEDICINE

## 2024-04-22 PROCEDURE — 86140 C-REACTIVE PROTEIN: CPT | Performed by: INTERNAL MEDICINE

## 2024-04-29 ENCOUNTER — LAB REQUISITION (OUTPATIENT)
Dept: LAB | Age: 74
End: 2024-04-29
Payer: MEDICARE

## 2024-04-29 ENCOUNTER — HOSPITAL ENCOUNTER (OUTPATIENT)
Dept: CT IMAGING | Facility: HOSPITAL | Age: 74
Discharge: HOME | End: 2024-04-29
Attending: INTERNAL MEDICINE
Payer: MEDICARE

## 2024-04-29 DIAGNOSIS — K75.0 ABSCESS OF LIVER: ICD-10-CM

## 2024-04-29 DIAGNOSIS — K75.0 LIVER ABSCESS (HCC): ICD-10-CM

## 2024-04-29 LAB
ALBUMIN SERPL-MCNC: 4.1 G/DL (ref 3.2–4.8)
ALBUMIN/GLOB SERPL: 1.4 {RATIO} (ref 1–2)
ALP LIVER SERPL-CCNC: 109 U/L
ALT SERPL-CCNC: 29 U/L
ANION GAP SERPL CALC-SCNC: 7 MMOL/L (ref 0–18)
AST SERPL-CCNC: 36 U/L (ref ?–34)
BASOPHILS # BLD AUTO: 0.07 X10(3) UL (ref 0–0.2)
BASOPHILS NFR BLD AUTO: 1 %
BILIRUB SERPL-MCNC: 0.3 MG/DL (ref 0.2–1.1)
BUN BLD-MCNC: 23 MG/DL (ref 9–23)
BUN/CREAT SERPL: 25.3 (ref 10–20)
CALCIUM BLD-MCNC: 9.5 MG/DL (ref 8.7–10.4)
CHLORIDE SERPL-SCNC: 110 MMOL/L (ref 98–112)
CO2 SERPL-SCNC: 22 MMOL/L (ref 21–32)
CREAT BLD-MCNC: 0.91 MG/DL
CRP SERPL-MCNC: <0.4 MG/DL (ref ?–1)
DEPRECATED RDW RBC AUTO: 49.6 FL (ref 35.1–46.3)
EGFRCR SERPLBLD CKD-EPI 2021: 67 ML/MIN/1.73M2 (ref 60–?)
EOSINOPHIL # BLD AUTO: 0.1 X10(3) UL (ref 0–0.7)
EOSINOPHIL NFR BLD AUTO: 1.4 %
ERYTHROCYTE [DISTWIDTH] IN BLOOD BY AUTOMATED COUNT: 16.2 % (ref 11–15)
GLOBULIN PLAS-MCNC: 2.9 G/DL (ref 2.8–4.4)
GLUCOSE BLD-MCNC: 134 MG/DL (ref 70–99)
HCT VFR BLD AUTO: 37.8 %
HGB BLD-MCNC: 12.4 G/DL
IMM GRANULOCYTES # BLD AUTO: 0.02 X10(3) UL (ref 0–1)
IMM GRANULOCYTES NFR BLD: 0.3 %
LYMPHOCYTES # BLD AUTO: 0.96 X10(3) UL (ref 1–4)
LYMPHOCYTES NFR BLD AUTO: 13.9 %
MCH RBC QN AUTO: 27.4 PG (ref 26–34)
MCHC RBC AUTO-ENTMCNC: 32.8 G/DL (ref 31–37)
MCV RBC AUTO: 83.6 FL
MONOCYTES # BLD AUTO: 0.38 X10(3) UL (ref 0.1–1)
MONOCYTES NFR BLD AUTO: 5.5 %
NEUTROPHILS # BLD AUTO: 5.38 X10 (3) UL (ref 1.5–7.7)
NEUTROPHILS # BLD AUTO: 5.38 X10(3) UL (ref 1.5–7.7)
NEUTROPHILS NFR BLD AUTO: 77.9 %
OSMOLALITY SERPL CALC.SUM OF ELEC: 294 MOSM/KG (ref 275–295)
PLATELET # BLD AUTO: 476 10(3)UL (ref 150–450)
POTASSIUM SERPL-SCNC: 4.5 MMOL/L (ref 3.5–5.1)
PROT SERPL-MCNC: 7 G/DL (ref 5.7–8.2)
RBC # BLD AUTO: 4.52 X10(6)UL
SODIUM SERPL-SCNC: 139 MMOL/L (ref 136–145)
WBC # BLD AUTO: 6.9 X10(3) UL (ref 4–11)

## 2024-04-29 PROCEDURE — 85025 COMPLETE CBC W/AUTO DIFF WBC: CPT | Performed by: INTERNAL MEDICINE

## 2024-04-29 PROCEDURE — 76080 X-RAY EXAM OF FISTULA: CPT | Performed by: INTERNAL MEDICINE

## 2024-04-29 PROCEDURE — 49424 ASSESS CYST CONTRAST INJECT: CPT | Performed by: INTERNAL MEDICINE

## 2024-04-29 PROCEDURE — 80053 COMPREHEN METABOLIC PANEL: CPT | Performed by: INTERNAL MEDICINE

## 2024-04-29 PROCEDURE — 86140 C-REACTIVE PROTEIN: CPT | Performed by: INTERNAL MEDICINE

## 2024-05-06 ENCOUNTER — LAB REQUISITION (OUTPATIENT)
Dept: LAB | Age: 74
End: 2024-05-06
Payer: MEDICARE

## 2024-05-06 DIAGNOSIS — K75.0 ABSCESS OF LIVER: ICD-10-CM

## 2024-05-06 LAB
ALBUMIN SERPL-MCNC: 4.1 G/DL (ref 3.2–4.8)
ALBUMIN/GLOB SERPL: 1.5 {RATIO} (ref 1–2)
ALP LIVER SERPL-CCNC: 85 U/L
ALT SERPL-CCNC: 31 U/L
ANION GAP SERPL CALC-SCNC: 9 MMOL/L (ref 0–18)
AST SERPL-CCNC: 35 U/L (ref ?–34)
BASOPHILS # BLD AUTO: 0.06 X10(3) UL (ref 0–0.2)
BASOPHILS NFR BLD AUTO: 0.8 %
BILIRUB SERPL-MCNC: 0.4 MG/DL (ref 0.2–1.1)
BUN BLD-MCNC: 27 MG/DL (ref 9–23)
BUN/CREAT SERPL: 31 (ref 10–20)
CALCIUM BLD-MCNC: 9.5 MG/DL (ref 8.7–10.4)
CHLORIDE SERPL-SCNC: 105 MMOL/L (ref 98–112)
CO2 SERPL-SCNC: 24 MMOL/L (ref 21–32)
CREAT BLD-MCNC: 0.87 MG/DL
CRP SERPL-MCNC: <0.4 MG/DL (ref ?–1)
DEPRECATED RDW RBC AUTO: 49 FL (ref 35.1–46.3)
EGFRCR SERPLBLD CKD-EPI 2021: 71 ML/MIN/1.73M2 (ref 60–?)
EOSINOPHIL # BLD AUTO: 0.19 X10(3) UL (ref 0–0.7)
EOSINOPHIL NFR BLD AUTO: 2.6 %
ERYTHROCYTE [DISTWIDTH] IN BLOOD BY AUTOMATED COUNT: 15.7 % (ref 11–15)
FASTING STATUS PATIENT QL REPORTED: NO
GLOBULIN PLAS-MCNC: 2.7 G/DL (ref 2–3.5)
GLUCOSE BLD-MCNC: 134 MG/DL (ref 70–99)
HCT VFR BLD AUTO: 38.3 %
HGB BLD-MCNC: 12 G/DL
IMM GRANULOCYTES # BLD AUTO: 0.02 X10(3) UL (ref 0–1)
IMM GRANULOCYTES NFR BLD: 0.3 %
LYMPHOCYTES # BLD AUTO: 1.18 X10(3) UL (ref 1–4)
LYMPHOCYTES NFR BLD AUTO: 15.9 %
MCH RBC QN AUTO: 26.7 PG (ref 26–34)
MCHC RBC AUTO-ENTMCNC: 31.3 G/DL (ref 31–37)
MCV RBC AUTO: 85.3 FL
MONOCYTES # BLD AUTO: 0.58 X10(3) UL (ref 0.1–1)
MONOCYTES NFR BLD AUTO: 7.8 %
NEUTROPHILS # BLD AUTO: 5.39 X10 (3) UL (ref 1.5–7.7)
NEUTROPHILS # BLD AUTO: 5.39 X10(3) UL (ref 1.5–7.7)
NEUTROPHILS NFR BLD AUTO: 72.6 %
OSMOLALITY SERPL CALC.SUM OF ELEC: 293 MOSM/KG (ref 275–295)
PLATELET # BLD AUTO: 392 10(3)UL (ref 150–450)
POTASSIUM SERPL-SCNC: 5.1 MMOL/L (ref 3.5–5.1)
PROT SERPL-MCNC: 6.8 G/DL (ref 5.7–8.2)
RBC # BLD AUTO: 4.49 X10(6)UL
SODIUM SERPL-SCNC: 138 MMOL/L (ref 136–145)
WBC # BLD AUTO: 7.4 X10(3) UL (ref 4–11)

## 2024-05-06 PROCEDURE — 86140 C-REACTIVE PROTEIN: CPT | Performed by: INTERNAL MEDICINE

## 2024-05-06 PROCEDURE — 80053 COMPREHEN METABOLIC PANEL: CPT | Performed by: INTERNAL MEDICINE

## 2024-05-06 PROCEDURE — 85025 COMPLETE CBC W/AUTO DIFF WBC: CPT | Performed by: INTERNAL MEDICINE

## 2024-05-08 ENCOUNTER — PATIENT OUTREACH (OUTPATIENT)
Dept: INFECTIOUS DISEASE | Facility: CLINIC | Age: 74
End: 2024-05-08

## 2024-05-08 DIAGNOSIS — K75.0 LIVER ABSCESS (HCC): Primary | ICD-10-CM

## 2024-05-08 NOTE — PROGRESS NOTES
Liver abscesses due to Klebsiella pneumonia sp IR drainage; Repeat CT abscessogram, decreased size of lesion, no output drain removed.  Complete Invanz on 5/15, then transition to Levaquin for another month with serial CT.

## 2024-05-13 ENCOUNTER — LAB REQUISITION (OUTPATIENT)
Dept: LAB | Age: 74
End: 2024-05-13

## 2024-05-13 DIAGNOSIS — K75.0 ABSCESS OF LIVER: ICD-10-CM

## 2024-05-13 LAB
ALBUMIN SERPL-MCNC: 4.1 G/DL (ref 3.2–4.8)
ALBUMIN/GLOB SERPL: 1.5 {RATIO} (ref 1–2)
ALP LIVER SERPL-CCNC: 82 U/L
ALT SERPL-CCNC: 65 U/L
ANION GAP SERPL CALC-SCNC: 11 MMOL/L (ref 0–18)
AST SERPL-CCNC: 48 U/L (ref ?–34)
BASOPHILS # BLD AUTO: 0.1 X10(3) UL (ref 0–0.2)
BASOPHILS NFR BLD AUTO: 1.6 %
BILIRUB SERPL-MCNC: 0.3 MG/DL (ref 0.2–1.1)
BUN BLD-MCNC: 25 MG/DL (ref 9–23)
BUN/CREAT SERPL: 25.3 (ref 10–20)
CALCIUM BLD-MCNC: 9 MG/DL (ref 8.7–10.4)
CHLORIDE SERPL-SCNC: 108 MMOL/L (ref 98–112)
CO2 SERPL-SCNC: 21 MMOL/L (ref 21–32)
CREAT BLD-MCNC: 0.99 MG/DL
CRP SERPL-MCNC: <0.4 MG/DL (ref ?–1)
DEPRECATED RDW RBC AUTO: 47.2 FL (ref 35.1–46.3)
EGFRCR SERPLBLD CKD-EPI 2021: 61 ML/MIN/1.73M2 (ref 60–?)
EOSINOPHIL # BLD AUTO: 0.15 X10(3) UL (ref 0–0.7)
EOSINOPHIL NFR BLD AUTO: 2.4 %
ERYTHROCYTE [DISTWIDTH] IN BLOOD BY AUTOMATED COUNT: 15.3 % (ref 11–15)
FASTING STATUS PATIENT QL REPORTED: NO
GLOBULIN PLAS-MCNC: 2.8 G/DL (ref 2–3.5)
GLUCOSE BLD-MCNC: 132 MG/DL (ref 70–99)
HCT VFR BLD AUTO: 39.7 %
HGB BLD-MCNC: 12.4 G/DL
IMM GRANULOCYTES # BLD AUTO: 0.01 X10(3) UL (ref 0–1)
IMM GRANULOCYTES NFR BLD: 0.2 %
LYMPHOCYTES # BLD AUTO: 1.19 X10(3) UL (ref 1–4)
LYMPHOCYTES NFR BLD AUTO: 19.2 %
MCH RBC QN AUTO: 26.3 PG (ref 26–34)
MCHC RBC AUTO-ENTMCNC: 31.2 G/DL (ref 31–37)
MCV RBC AUTO: 84.3 FL
MONOCYTES # BLD AUTO: 0.46 X10(3) UL (ref 0.1–1)
MONOCYTES NFR BLD AUTO: 7.4 %
NEUTROPHILS # BLD AUTO: 4.28 X10 (3) UL (ref 1.5–7.7)
NEUTROPHILS # BLD AUTO: 4.28 X10(3) UL (ref 1.5–7.7)
NEUTROPHILS NFR BLD AUTO: 69.2 %
OSMOLALITY SERPL CALC.SUM OF ELEC: 296 MOSM/KG (ref 275–295)
PLATELET # BLD AUTO: 409 10(3)UL (ref 150–450)
POTASSIUM SERPL-SCNC: 4.2 MMOL/L (ref 3.5–5.1)
PROT SERPL-MCNC: 6.9 G/DL (ref 5.7–8.2)
RBC # BLD AUTO: 4.71 X10(6)UL
SODIUM SERPL-SCNC: 140 MMOL/L (ref 136–145)
WBC # BLD AUTO: 6.2 X10(3) UL (ref 4–11)

## 2024-05-13 PROCEDURE — 86140 C-REACTIVE PROTEIN: CPT | Performed by: INTERNAL MEDICINE

## 2024-05-13 PROCEDURE — 85025 COMPLETE CBC W/AUTO DIFF WBC: CPT | Performed by: INTERNAL MEDICINE

## 2024-05-13 PROCEDURE — 80053 COMPREHEN METABOLIC PANEL: CPT | Performed by: INTERNAL MEDICINE

## 2024-06-10 ENCOUNTER — HOSPITAL ENCOUNTER (OUTPATIENT)
Dept: CT IMAGING | Facility: HOSPITAL | Age: 74
Discharge: HOME | End: 2024-06-10
Attending: INTERNAL MEDICINE
Payer: MEDICARE

## 2024-06-10 DIAGNOSIS — K75.0 LIVER ABSCESS (HCC): ICD-10-CM

## 2024-06-10 PROCEDURE — 74177 CT ABD & PELVIS W/CONTRAST: CPT | Performed by: INTERNAL MEDICINE

## 2025-05-11 ENCOUNTER — HOSPITAL ENCOUNTER (EMERGENCY)
Facility: HOSPITAL | Age: 75
Discharge: HOME | End: 2025-05-11
Attending: EMERGENCY MEDICINE
Payer: MEDICARE

## 2025-05-11 ENCOUNTER — APPOINTMENT (OUTPATIENT)
Dept: GENERAL RADIOLOGY | Facility: HOSPITAL | Age: 75
End: 2025-05-11
Attending: EMERGENCY MEDICINE
Payer: MEDICARE

## 2025-05-11 VITALS
OXYGEN SATURATION: 100 % | HEART RATE: 66 BPM | TEMPERATURE: 98 F | SYSTOLIC BLOOD PRESSURE: 152 MMHG | WEIGHT: 133 LBS | HEIGHT: 64 IN | BODY MASS INDEX: 22.71 KG/M2 | RESPIRATION RATE: 12 BRPM | DIASTOLIC BLOOD PRESSURE: 59 MMHG

## 2025-05-11 DIAGNOSIS — M25.519 SHOULDER PAIN, UNSPECIFIED CHRONICITY, UNSPECIFIED LATERALITY: ICD-10-CM

## 2025-05-11 DIAGNOSIS — H53.8 BLURRY VISION: Primary | ICD-10-CM

## 2025-05-11 DIAGNOSIS — E16.2 HYPOGLYCEMIA: ICD-10-CM

## 2025-05-11 LAB
ALBUMIN SERPL-MCNC: 4.7 G/DL (ref 3.2–4.8)
ALBUMIN/GLOB SERPL: 2 {RATIO} (ref 1–2)
ALP LIVER SERPL-CCNC: 58 U/L (ref 55–142)
ALT SERPL-CCNC: 23 U/L (ref 10–49)
ANION GAP SERPL CALC-SCNC: 10 MMOL/L (ref 0–18)
AST SERPL-CCNC: 22 U/L (ref ?–34)
ATRIAL RATE: 70 BPM
BASE EXCESS BLDV CALC-SCNC: -4.9 MMOL/L
BASOPHILS # BLD AUTO: 0.06 X10(3) UL (ref 0–0.2)
BASOPHILS NFR BLD AUTO: 0.6 %
BILIRUB SERPL-MCNC: 0.2 MG/DL (ref 0.2–1.1)
BUN BLD-MCNC: 27 MG/DL (ref 9–23)
CA-I BLD-SCNC: 1.12 MMOL/L (ref 0.95–1.32)
CALCIUM BLD-MCNC: 9.1 MG/DL (ref 8.7–10.6)
CHLORIDE SERPL-SCNC: 103 MMOL/L (ref 98–112)
CO2 SERPL-SCNC: 20 MMOL/L (ref 21–32)
CREAT BLD-MCNC: 1.24 MG/DL (ref 0.55–1.02)
EGFRCR SERPLBLD CKD-EPI 2021: 46 ML/MIN/1.73M2 (ref 60–?)
EOSINOPHIL # BLD AUTO: 0.15 X10(3) UL (ref 0–0.7)
EOSINOPHIL NFR BLD AUTO: 1.6 %
ERYTHROCYTE [DISTWIDTH] IN BLOOD BY AUTOMATED COUNT: 15.6 %
GLOBULIN PLAS-MCNC: 2.4 G/DL (ref 2–3.5)
GLUCOSE BLD-MCNC: 179 MG/DL (ref 70–99)
GLUCOSE BLD-MCNC: 279 MG/DL (ref 70–99)
GLUCOSE BLD-MCNC: 301 MG/DL (ref 70–99)
HCO3 BLDV-SCNC: 20.7 MEQ/L (ref 22–26)
HCT VFR BLD AUTO: 37.3 % (ref 35–48)
HGB BLD-MCNC: 11.9 G/DL (ref 12–16)
IMM GRANULOCYTES # BLD AUTO: 0.04 X10(3) UL (ref 0–1)
IMM GRANULOCYTES NFR BLD: 0.4 %
LYMPHOCYTES # BLD AUTO: 1.38 X10(3) UL (ref 1–4)
LYMPHOCYTES NFR BLD AUTO: 14.4 %
MCH RBC QN AUTO: 24.2 PG (ref 26–34)
MCHC RBC AUTO-ENTMCNC: 31.9 G/DL (ref 31–37)
MCV RBC AUTO: 76 FL (ref 80–100)
MONOCYTES # BLD AUTO: 0.7 X10(3) UL (ref 0.1–1)
MONOCYTES NFR BLD AUTO: 7.3 %
NEUTROPHILS # BLD AUTO: 7.28 X10 (3) UL (ref 1.5–7.7)
NEUTROPHILS # BLD AUTO: 7.28 X10(3) UL (ref 1.5–7.7)
NEUTROPHILS NFR BLD AUTO: 75.7 %
OSMOLALITY SERPL CALC.SUM OF ELEC: 292 MOSM/KG (ref 275–295)
OXYHGB MFR BLDV: 80.9 % (ref 72–78)
P AXIS: 60 DEGREES
P-R INTERVAL: 166 MS
PCO2 BLDV: 39 MM HG (ref 38–50)
PH BLDV: 7.33 [PH] (ref 7.33–7.43)
PLATELET # BLD AUTO: 364 10(3)UL (ref 150–450)
PO2 BLDV: 51 MM HG (ref 30–50)
POTASSIUM BLD-SCNC: 4.1 MMOL/L (ref 3.6–5.1)
POTASSIUM SERPL-SCNC: 4.2 MMOL/L (ref 3.5–5.1)
PROT SERPL-MCNC: 7.1 G/DL (ref 5.7–8.2)
Q-T INTERVAL: 406 MS
QRS DURATION: 80 MS
QTC CALCULATION (BEZET): 438 MS
R AXIS: 29 DEGREES
RBC # BLD AUTO: 4.91 X10(6)UL (ref 3.8–5.3)
SODIUM BLD-SCNC: 131 MMOL/L (ref 135–145)
SODIUM SERPL-SCNC: 133 MMOL/L (ref 136–145)
T AXIS: 53 DEGREES
TROPONIN I SERPL HS-MCNC: 4 NG/L (ref ?–34)
VENTRICULAR RATE: 70 BPM
WBC # BLD AUTO: 9.6 X10(3) UL (ref 4–11)

## 2025-05-11 PROCEDURE — 84484 ASSAY OF TROPONIN QUANT: CPT | Performed by: EMERGENCY MEDICINE

## 2025-05-11 PROCEDURE — 71045 X-RAY EXAM CHEST 1 VIEW: CPT | Performed by: EMERGENCY MEDICINE

## 2025-05-11 PROCEDURE — 85025 COMPLETE CBC W/AUTO DIFF WBC: CPT | Performed by: EMERGENCY MEDICINE

## 2025-05-11 PROCEDURE — 93005 ELECTROCARDIOGRAM TRACING: CPT

## 2025-05-11 PROCEDURE — 99285 EMERGENCY DEPT VISIT HI MDM: CPT

## 2025-05-11 PROCEDURE — 80053 COMPREHEN METABOLIC PANEL: CPT | Performed by: EMERGENCY MEDICINE

## 2025-05-11 PROCEDURE — 82330 ASSAY OF CALCIUM: CPT | Performed by: EMERGENCY MEDICINE

## 2025-05-11 PROCEDURE — 82803 BLOOD GASES ANY COMBINATION: CPT | Performed by: EMERGENCY MEDICINE

## 2025-05-11 PROCEDURE — 96360 HYDRATION IV INFUSION INIT: CPT

## 2025-05-11 PROCEDURE — 84132 ASSAY OF SERUM POTASSIUM: CPT | Performed by: EMERGENCY MEDICINE

## 2025-05-11 PROCEDURE — 84295 ASSAY OF SERUM SODIUM: CPT | Performed by: EMERGENCY MEDICINE

## 2025-05-11 PROCEDURE — 82962 GLUCOSE BLOOD TEST: CPT

## 2025-05-11 PROCEDURE — 93010 ELECTROCARDIOGRAM REPORT: CPT

## 2025-05-11 NOTE — ED PROVIDER NOTES
Patient Seen in: [facility] Emergency Department      History     Chief Complaint   Patient presents with    Hyperglycemia    Blurred Vision    Anxiety     Stated Complaint: hyperglycima, blurred vision and anxious    Subjective:   HPI    Patient here for symptoms that occurred around 11:00 this morning.  She was in her kitchen and felt some bilateral shoulder pain and then she thinks she got a bit nervous.  She states her vision was blurry but this improved after her  cleaned her glasses.  She felt a bit unwell.  She checked her blood sugar while she was eating and it was in the 240s and this made her more nervous.    She  states that physicians have told her in the past to be \"less anxious\" but she wanted to come here for reassurance.  She is specifically concerned about a stroke or heart attack.      No double vision no visual field cuts no word finding difficulty.  No numbness or weakness.  No difficulties in her gait.    History of Present Illness               Objective:     Past Medical History:    Diabetes (HCC)    Essential hypertension    High blood pressure    High cholesterol    Hyperlipidemia              Past Surgical History:   Procedure Laterality Date    Hysterectomy      Total abdom hysterectomy                  Social History     Socioeconomic History    Marital status:    Tobacco Use    Smoking status: Never    Smokeless tobacco: Never   Vaping Use    Vaping status: Never Used   Substance and Sexual Activity    Alcohol use: Never     Social Drivers of Health     Food Insecurity: No Food Insecurity (4/6/2024)    Food Insecurity     Food Insecurity: Never true   Transportation Needs: No Transportation Needs (4/6/2024)    Transportation Needs     Lack of Transportation: No   Housing Stability: Low Risk  (4/6/2024)    Housing Stability     Housing Instability: No                                Physical Exam     ED Triage Vitals   BP 05/11/25 1308 (!) 186/56   Pulse 05/11/25 1308  70   Resp 05/11/25 1308 15   Temp 05/11/25 1308 98.3 °F (36.8 °C)   Temp src 05/11/25 1308 Oral   SpO2 05/11/25 1330 100 %   O2 Device 05/11/25 1308 None (Room air)       Current Vitals:   Vital Signs  BP: 154/62  Pulse: 67  Resp: 21  Temp: 98.3 °F (36.8 °C)  Temp src: Oral  MAP (mmHg): 89    Oxygen Therapy  SpO2: 100 %  O2 Device: None (Room air)        Physical Exam      Constitutional:  Appears well-developed and well-nourished. no Distress.  Head: Normocephalic and atraumatic.   Nose: Nose normal.   Eyes: EOM are normal. Pupils are equal, round, and reactive to light.   Neck: Normal range of motion. Neck supple. No JVD present.   Cardiovascular: Normal rate and regular rhythm.    Pulmonary/Chest: Effort normal and breath sounds normal. No stridor.   Abdominal: Soft. There is no tenderness. There is no guarding.   Musculoskeletal: Exhibits no edema or tenderness.     Neurological: Pt is oriented to person, place, and time.    There are no cranial nerve deficits.    There is no nystagmus.  Speech is fluent and not slurred.   There is no word finding difficulty.    No neglect. No gaze  No visual field deficit.    There is no pronator drift.    Strength is 5 out of 5 in the upper extremities bilaterally.    Sensation is symmetric in the upper extremities and not diminished.    There is no lower extremity drift  Sensation is normal in the lower extremities and not diminished.  It is intact to fine touch.    There is full strength with hip flexion, knee flexion and extension, ankle plantarflexion, and at the EHL.  This is true bilaterally.      Finger to nose intact.  Heel to shin intact.      Skin: Skin is warm and dry.   Psychiatric: Normal mood and affect. Thought content normal.         Physical Exam                ED Course     Labs Reviewed   COMP METABOLIC PANEL (14) - Abnormal; Notable for the following components:       Result Value    Glucose 301 (*)     Sodium 133 (*)     CO2 20.0 (*)     BUN 27 (*)      Creatinine 1.24 (*)     eGFR-Cr 46 (*)     All other components within normal limits   CBC WITH DIFFERENTIAL WITH PLATELET - Abnormal; Notable for the following components:    HGB 11.9 (*)     MCV 76.0 (*)     MCH 24.2 (*)     All other components within normal limits   VBG PANEL WITH ELECTROLYTES - Abnormal; Notable for the following components:    Venous pO2 51 (*)     Venous HCO3 20.7 (*)     Venous O2Hb 80.9 (*)     Sodium Blood Gas 131 (*)     All other components within normal limits   POCT GLUCOSE - Abnormal; Notable for the following components:    POC Glucose 279 (*)     All other components within normal limits   TROPONIN I HIGH SENSITIVITY - Normal   URINALYSIS WITH CULTURE REFLEX   RAINBOW DRAW LAVENDER   RAINBOW DRAW LIGHT GREEN   RAINBOW DRAW BLUE   RAINBOW DRAW GOLD     EKG    Rate, intervals and axes as noted on EKG Report.  Rate: 70  Rhythm: Sinus Rhythm  Reading: Sinus rhythm acute ischemia              Results            Labs including CBC CMP troponin reassuring save hyperglycemia.  VBG does not suggest DKA    XR CHEST AP PORTABLE  (CPT=71045)  Result Date: 5/11/2025  CONCLUSION: No acute cardiopulmonary abnormality.   LOCATION:  [facility]      Dictated by (CST): Anival Evans MD on 5/11/2025 at 3:02 PM     Finalized by (CST): Anival Evans MD on 5/11/2025 at 3:03 PM                      MDM          Differential diagnoses considered: Atypical presentation of life-threatening ACS, DKA, dehydration, left eye disturbance all considered    -Negative troponin with EKG unchanged from previous over 2 hours after her symptoms started.  ACS extremely unlikely.  Has not been having any exertional symptoms either.    -With regards to her transitory \"blurry vision\" that improved after cleaning her glasses and also the absence of any other neurological episodes my suspicion for acute stroke is extremely low. She should continue close follow-up with the PCP to assure that she is medically optimized.        I  visualized the radiology studies, my independent interpretation: No acute CHF    *Discussion of ongoing management of this patient's care included: n/a  *Comorbidities contributing to the complexity of decision making:  diabetes  *External charts reviewed: n/a  *Additional sources of history: n/a    Shared decision making was done by: patient, myself.          Medical Decision Making      Disposition and Plan     Clinical Impression:  1. Blurry vision    2. Shoulder pain, unspecified chronicity, unspecified laterality    3. Hypoglycemia         Disposition:  Discharge  5/11/2025  3:06 pm    Follow-up:  Carole Franklin MD  [address] [place] [id number] [phone]    Follow up            Medications Prescribed:  Current Discharge Medication List          Supplementary Documentation:

## 2025-05-11 NOTE — ED INITIAL ASSESSMENT (HPI)
Pt states this morning while cooking and stirring she felt bilateral shoulder pain, also noted blurred vision, states her blood sugar was 245, states took her insulin and a lorazepam.  Pt denies c/o at this time.

## (undated) NOTE — LETTER
Patient Name: Essence Acuña        : 1950       Medical Record #: [id number]    CONSENT FOR PROCEDURES/SEDATION    Date: 4/10/2024       Time: 8:53 AM        1. I authorize the performance upon Essence Acuña the following:    __Image guided drain abscess liver.      2. I authorize Dr. Kenzie Florian (and whomever is designated as the doctor’s assistant), to perform the above mentioned procedures.    3. If any unforeseen conditions arise during this procedure calling for additional procedures, operations, or medications (including anesthesia and blood transfusion), I  further request and authorize the doctor to do whatever he/she deems advisable in my interest.    4. I consent to the taking and reproduction of any photographs in the course of this procedure for professional purposes.    5. I consent to the administration of such sedation as may be considered necessary or advisable by the physician responsible for this service, with the exception of  __None__.    6. I have been informed by my doctor of the nature and purpose of this procedure/sedation, possible alternative methods of treatment, risk involved and possible complications.      Signature of Patient:  ___________________________    Signature of person authorized to consent for patient: Relationship to patient:  ___________________________    ___________________    Witness: ____________________     Date: ______________    Provider: ____________________     Date: ______________